# Patient Record
Sex: MALE | Race: WHITE | Employment: FULL TIME | URBAN - METROPOLITAN AREA
[De-identification: names, ages, dates, MRNs, and addresses within clinical notes are randomized per-mention and may not be internally consistent; named-entity substitution may affect disease eponyms.]

---

## 2019-10-21 PROBLEM — G43.719 INTRACTABLE CHRONIC MIGRAINE WITHOUT AURA AND WITHOUT STATUS MIGRAINOSUS: Status: ACTIVE | Noted: 2019-10-21

## 2019-10-21 PROBLEM — K59.09 OTHER CONSTIPATION: Chronic | Status: ACTIVE | Noted: 2019-10-21

## 2019-10-21 PROBLEM — Z87.828 H/O HEAD INJURY: Chronic | Status: ACTIVE | Noted: 2019-10-21

## 2019-11-11 PROBLEM — M25.512 CHRONIC PAIN IN LEFT SHOULDER: Chronic | Status: ACTIVE | Noted: 2019-11-11

## 2019-11-11 PROBLEM — G89.29 CHRONIC PAIN IN LEFT SHOULDER: Chronic | Status: ACTIVE | Noted: 2019-11-11

## 2021-06-10 ENCOUNTER — TELEPHONE (OUTPATIENT)
Dept: FAMILY MEDICINE CLINIC | Age: 51
End: 2021-06-10

## 2021-06-10 NOTE — TELEPHONE ENCOUNTER
Pts wife called stating Nadia Zhou has been in lots of pain & has been up since 4 am. He does have a history or kidney stones. BODØ is wanting to talk to a nurse. She is aware that we are booked for today & tomorrow.

## 2021-06-20 ENCOUNTER — DOCUMENTATION ONLY (OUTPATIENT)
Dept: FAMILY MEDICINE CLINIC | Age: 51
End: 2021-06-20

## 2021-06-20 PROBLEM — Z87.442 HISTORY OF RENAL STONE: Status: ACTIVE | Noted: 2021-06-20

## 2021-06-20 NOTE — PROGRESS NOTES
Pt seen by urologist Dr Christine Michael on 6-15-21 with c/o a kidney stone. He had passed a stone the previous day, grain-sized, no pain. He was educated about diet and fluid intake. The plan is to do a 24 hours urine sample and he will return in late August to have this done.

## 2021-08-04 ENCOUNTER — TELEPHONE (OUTPATIENT)
Dept: FAMILY MEDICINE CLINIC | Age: 51
End: 2021-08-04

## 2021-08-04 DIAGNOSIS — G43.719 INTRACTABLE CHRONIC MIGRAINE WITHOUT AURA AND WITHOUT STATUS MIGRAINOSUS: ICD-10-CM

## 2021-08-04 RX ORDER — RIZATRIPTAN BENZOATE 10 MG/1
10 TABLET ORAL
Qty: 10 TABLET | Refills: 0 | Status: SHIPPED | OUTPATIENT
Start: 2021-08-04 | End: 2022-02-23 | Stop reason: SDUPTHER

## 2021-08-04 NOTE — TELEPHONE ENCOUNTER
Script sent for #10 tabs but future refills will require an appt since it has almost been a year since his last appt and it was virtual so next visit needs to be in office.

## 2021-08-04 NOTE — TELEPHONE ENCOUNTER
Patient requesting maxalt 10 mg sent to [facility]. He states that he had been given this and hadn't needed in a while but migraines have returned.

## 2021-08-04 NOTE — TELEPHONE ENCOUNTER
----- Message from Sonali Manuel sent at 8/4/2021  9:37 AM EDT -----  Regarding: Gabrielle oBjorquez - NP/ refill   #out of this med  Caller (if not patient):Relationship of caller (if not patient):Best contact number(s): [phone] of medication and dosage if known: rizatriptan (MAXALT) 10 mg tabletIs patient out of this medication (yes/no): YesPharm[facility]  [place] [address] listed in chart? (yes/no): NOPharmacy phone number: unknownDate of last visit: 6/22/21Details to clarify the request: pt was given this med for  his migraines that work GREAT. Pt migraines are back and he is OUT of this med. Please call in as soon as possible to [facility].   Thank you

## 2022-02-23 ENCOUNTER — OFFICE VISIT (OUTPATIENT)
Dept: FAMILY MEDICINE CLINIC | Age: 52
End: 2022-02-23

## 2022-02-23 VITALS
SYSTOLIC BLOOD PRESSURE: 159 MMHG | WEIGHT: 186.38 LBS | TEMPERATURE: 98.4 F | BODY MASS INDEX: 26.68 KG/M2 | RESPIRATION RATE: 18 BRPM | OXYGEN SATURATION: 97 % | HEART RATE: 65 BPM | DIASTOLIC BLOOD PRESSURE: 77 MMHG | HEIGHT: 70 IN

## 2022-02-23 DIAGNOSIS — R03.0 ELEVATED BLOOD PRESSURE READING: ICD-10-CM

## 2022-02-23 DIAGNOSIS — G43.119 INTRACTABLE MIGRAINE WITH AURA WITHOUT STATUS MIGRAINOSUS: Primary | ICD-10-CM

## 2022-02-23 DIAGNOSIS — Z59.89 DOES NOT HAVE HEALTH INSURANCE: ICD-10-CM

## 2022-02-23 DIAGNOSIS — J01.00 ACUTE NON-RECURRENT MAXILLARY SINUSITIS: ICD-10-CM

## 2022-02-23 PROCEDURE — 99213 OFFICE O/P EST LOW 20 MIN: CPT | Performed by: NURSE PRACTITIONER

## 2022-02-23 RX ORDER — AZITHROMYCIN 250 MG/1
TABLET, FILM COATED ORAL
Qty: 6 TABLET | Refills: 0 | Status: SHIPPED | OUTPATIENT
Start: 2022-02-23 | End: 2022-02-28

## 2022-02-23 RX ORDER — RIZATRIPTAN BENZOATE 10 MG/1
10 TABLET ORAL
Qty: 10 TABLET | Refills: 5 | Status: SHIPPED | OUTPATIENT
Start: 2022-02-23 | End: 2022-02-23

## 2022-02-23 SDOH — ECONOMIC STABILITY - INCOME SECURITY: OTHER PROBLEMS RELATED TO HOUSING AND ECONOMIC CIRCUMSTANCES: Z59.89

## 2022-02-23 NOTE — PROGRESS NOTES
Subjective:     Chief Complaint   Patient presents with    Headache     gets a \"blurred horizontal line in vision\"    Mass     back of neck       Munir Ellington is a 46 y.o. male accompanied by his wife, who presents today to follow up for migraines. Started having migraines in 2012 after a head injury. His mother gets them, too. He reports a visual aura, he will see a horizontal line running across his vision. Vision will get blurry. Within 20 minutes he will get a migraine. Reports photophobia. Maxalt helps and he would like a refill. He gets a migraine about once a month. Has one now and believes it is related to a sinus infection. He is a  but for the past few weeks he has been doing some work in an old [employer] where he believes there may be mold. He reports nasal congestion and sinus pressure and right ear fullness. Not taking anything OTC for allergies. Denies fever or chills. BP is elevated today but he has been taking lots of Excedrin for his headache since running out of the Maxalt. No hx of HTN. Denies CP, SOB, dizziness, or swelling. He states he has a \"knot\" on the back of his neck. It was swollen last night, better today. He has been doing some overhead work with a pole saw. History of head injury   Back in 2012 he was working on a fish boat and a piece of heavy equipment or machinery fell on top of his head. He was wearing a hard hat but it knocked him unconscious for a few minutes. He says he suffered some brain swelling and was told he would have problems with short term memory loss in the future. He subsequently developed migraine headaches and his neurologist Dr Rachelle Winkler in [place] started him on Nortryptiline. The medication, however, made him feel like a \"zombie\" so he stopped taking it. Has been doing better with Maxalt PRN. Has not seen neuro in over 8 years.        Health maintenance  Colonoscopy- done 10/2019, repeat 5 years (2024)  Needs lipid and Hep C screening. He declines today, he has no insurance. Shingrix-not interested at previous appt, not addressed today  Covid vaccine- not discussed      Patient Active Problem List   Diagnosis Code    Intractable chronic migraine without aura and without status migrainosus G43.719    H/O head injury Z87.828    Other constipation K59.09    Chronic pain in left shoulder M25.512, G89.29    History of renal stone Z87.442       Past Medical History:   Diagnosis Date    Blurred vision     Head injuries 2012    Headache        No current outpatient medications on file.     No Known Allergies    Past Surgical History:   Procedure Laterality Date    HX COLONOSCOPY  2019    normal, repeat in 5 years       Social History     Tobacco Use   Smoking Status Never Smoker   Smokeless Tobacco Never Used       Social History     Socioeconomic History    Marital status: SINGLE   Tobacco Use    Smoking status: Never Smoker    Smokeless tobacco: Never Used   Substance and Sexual Activity    Alcohol use: Not Currently    Drug use: Never    Sexual activity: Yes       Family History   Problem Relation Age of Onset    Cancer Mother     Heart Disease Father        ROS:  Gen: denies fever, chills, or fatigue  HEENT:+H/A, nasal congestion, and right ear fullness, denies sore throat  Resp: denies dyspnea, cough, or wheezing  CV: denies chest pain, pressure, or palpitations  Extremeties: denies edema  GI[de-identified] denies abdominal pain, nausea, vomiting, diarrhea, or constipation  Musculoskeletal: no joint pain, stiffness, or muscle cramps  Neuro: denies numbness/tingling or dizziness  Skin: denies rashes or new lesions +knot on back of neck  Psych: denies anxiety, depression, evelia, or other changes in mood      Objective:     Visit Vitals  BP (!) 159/77 (BP 1 Location: Left arm, BP Patient Position: Sitting)   Pulse 65   Temp 98.4 °F (36.9 °C) (Temporal)   Resp 18   Ht 5' 10\" (1.778 m)   Wt 186 lb 6 oz (84.5 kg)   SpO2 97%   BMI 26.74 kg/m² Body mass index is 26.74 kg/m². General: Alert and oriented. No acute distress. Well nourished. HEENT :  Ears:TMs normal. Canals clear. Eyes: Sclera white, conjunctiva clear. PERRLA. Extra ocular movements intact. Nose: Patent. Nasal mucosa pink, non-edematous, and without drainage. Mouth: Pharynx non-erythematous, without exudate   Neck: Supple with FROM. No thyroid-megaly, carotid bruits, or lymphadenopathy +palpable trigger point to right neck  Lungs: Breathing even and unlabored. All lobes clear to auscultation bilaterally   Heart :RRR, S1 and S2 normal intensity, no extra heart sounds  Extremities: Non-edematous  Neuro: Cranial nerves grossly normal.  Psych: Mood and thought content appropriate for situation. Dressed appropriately and with good hygiene. Skin: Warm, dry, and intact. No lesions or discoloration. Assessment/ Plan:     Migraine headaches   Cont Maxalt prn- take as prescribed. Refilled today  Avoid triggers  F/U prn if migraines worsen in frequency or severity    Acute sinusitis  [facility] he will be finished with his work in [employer] as of tomorrow so will no longer be exposed to the mold  F/U prn if symptoms worsen or do not improve. Elevated BP without hx of HTN  He has been taking lots of Excedrin for his headache since running out of the Maxalt. Advised to start checking BP at home and notify me if SBP >140 or DBP >90. F/U prn elevated BP at home     Health maintenance  Colonoscopy- done 10/2019, repeat 5 years (2024)  Needs lipid and Hep C screening. He declines today, he has no insurance. Shingrix-not interested at previous appt, not addressed today  Covid vaccine- not discussed    Does not have health insurance  Referred to  for assistance selecting health insurance. Wife reports it has been a very frustrating process and they have received a quit for $1300/month which they cannot afford.         Verbal and written instructions (see AVS) provided.  Patient expresses understanding of diagnosis and treatment plan. Health Maintenance Due   Topic Date Due    Hepatitis C Screening  Never done    COVID-19 Vaccine (1) Never done    DTaP/Tdap/Td series (1 - Tdap) Never done    Lipid Screen  Never done    Shingrix Vaccine Age 50> (1 of 2) Never done    Flu Vaccine (1) Never done    Depression Screen  09/16/2021               Sun Arambula, NP

## 2022-02-23 NOTE — PROGRESS NOTES
1. Have you been to the ER, urgent care clinic since your last visit? Hospitalized since your last visit? No    2. Have you seen or consulted any other health care providers outside of the [address] [place] [address] since your last visit? Include any pap smears or colon screening.  No     Chief Complaint   Patient presents with    Headache     gets a \"blurred horizontal line in vision\"    Mass     back of neck     Visit Vitals  BP (!) 159/77 (BP 1 Location: Left arm, BP Patient Position: Sitting)   Pulse 65   Temp 98.4 °F (36.9 °C) (Temporal)   Resp 18   Ht 5' 10\" (1.778 m)   Wt 186 lb 6 oz (84.5 kg)   SpO2 97%   BMI 26.74 kg/m²

## 2022-02-24 ENCOUNTER — DOCUMENTATION ONLY (OUTPATIENT)
Dept: FAMILY MEDICINE CLINIC | Age: 52
End: 2022-02-24

## 2022-03-19 PROBLEM — G43.719 INTRACTABLE CHRONIC MIGRAINE WITHOUT AURA AND WITHOUT STATUS MIGRAINOSUS: Status: ACTIVE | Noted: 2019-10-21

## 2022-03-19 PROBLEM — M25.512 CHRONIC PAIN IN LEFT SHOULDER: Status: ACTIVE | Noted: 2019-11-11

## 2022-03-19 PROBLEM — G89.29 CHRONIC PAIN IN LEFT SHOULDER: Status: ACTIVE | Noted: 2019-11-11

## 2022-03-19 PROBLEM — Z87.442 HISTORY OF RENAL STONE: Status: ACTIVE | Noted: 2021-06-20

## 2022-03-20 PROBLEM — Z87.828 H/O HEAD INJURY: Status: ACTIVE | Noted: 2019-10-21

## 2022-03-20 PROBLEM — K59.09 OTHER CONSTIPATION: Status: ACTIVE | Noted: 2019-10-21

## 2022-09-29 ENCOUNTER — OFFICE VISIT (OUTPATIENT)
Dept: FAMILY MEDICINE CLINIC | Age: 52
End: 2022-09-29

## 2022-09-29 VITALS
SYSTOLIC BLOOD PRESSURE: 150 MMHG | WEIGHT: 186 LBS | BODY MASS INDEX: 26.63 KG/M2 | DIASTOLIC BLOOD PRESSURE: 92 MMHG | OXYGEN SATURATION: 97 % | TEMPERATURE: 98.9 F | RESPIRATION RATE: 16 BRPM | HEART RATE: 60 BPM | HEIGHT: 70 IN

## 2022-09-29 DIAGNOSIS — J40 BRONCHITIS: Primary | ICD-10-CM

## 2022-09-29 DIAGNOSIS — G62.9 NEUROPATHY: ICD-10-CM

## 2022-09-29 DIAGNOSIS — R05.9 COUGH: ICD-10-CM

## 2022-09-29 LAB
EXP DATE SOLUTION: NORMAL
EXP DATE SWAB: NORMAL
FLUAV+FLUBV AG NOSE QL IA.RAPID: NEGATIVE
FLUAV+FLUBV AG NOSE QL IA.RAPID: NEGATIVE
GLUCOSE POC: 102 MG/DL
LOT NUMBER SOLUTION: NORMAL
LOT NUMBER SWAB: NORMAL
SARS-COV-2 RNA POC: NEGATIVE
VALID INTERNAL CONTROL?: NO

## 2022-09-29 PROCEDURE — 87635 SARS-COV-2 COVID-19 AMP PRB: CPT | Performed by: NURSE PRACTITIONER

## 2022-09-29 PROCEDURE — 99213 OFFICE O/P EST LOW 20 MIN: CPT | Performed by: NURSE PRACTITIONER

## 2022-09-29 PROCEDURE — 82962 GLUCOSE BLOOD TEST: CPT | Performed by: NURSE PRACTITIONER

## 2022-09-29 PROCEDURE — 87502 INFLUENZA DNA AMP PROBE: CPT | Performed by: NURSE PRACTITIONER

## 2022-09-29 RX ORDER — DOXYCYCLINE 100 MG/1
100 TABLET ORAL 2 TIMES DAILY
Qty: 20 TABLET | Refills: 0 | Status: SHIPPED | OUTPATIENT
Start: 2022-09-29 | End: 2022-10-09

## 2022-09-29 RX ORDER — BENZONATATE 100 MG/1
100 CAPSULE ORAL
Qty: 21 CAPSULE | Refills: 0 | Status: SHIPPED | OUTPATIENT
Start: 2022-09-29 | End: 2022-10-06

## 2022-09-29 RX ORDER — ALBUTEROL SULFATE 90 UG/1
2 AEROSOL, METERED RESPIRATORY (INHALATION)
Qty: 18 G | Refills: 1 | Status: SHIPPED | OUTPATIENT
Start: 2022-09-29

## 2022-09-29 RX ORDER — GABAPENTIN 300 MG/1
300 CAPSULE ORAL
Qty: 30 CAPSULE | Refills: 0 | Status: SHIPPED | OUTPATIENT
Start: 2022-09-29

## 2022-09-29 RX ORDER — PREDNISONE 20 MG/1
20 TABLET ORAL 2 TIMES DAILY
Qty: 10 TABLET | Refills: 0 | Status: SHIPPED | OUTPATIENT
Start: 2022-09-29

## 2022-09-29 NOTE — PROGRESS NOTES
Identified pt with two pt identifiers(name and ). Reviewed record in preparation for visit and have obtained necessary documentation. Chief Complaint   Patient presents with    Altered mental status     Patient states he was hit in the head on fish boat and saw a neurologist years ago states doctor said he may have complication later. Foot Problem     Left heel numbness, right numbness tip of toes down the outer side        Cold Symptoms     With low grade temp, cough, congestion chest and nasal, headache, shortness of breath       1. \"Have you been to the ER, urgent care clinic since your last visit? Hospitalized since your last visit? \" No    2. \"Have you seen or consulted any other health care providers outside of the [address] [place] [address] since your last visit? \" No     3. For patients aged 39-70: Has the patient had a colonoscopy / FIT/ Cologuard? No      If the patient is female:    4. For patients aged 41-77: Has the patient had a mammogram within the past 2 years? NA - based on age or sex      11. For patients aged 21-65: Has the patient had a pap smear?  NA - based on age or sex      Symptom review:    Yes Fever   NO  Shaking chills  YES Cough  YESHeadaches  YES Body aches  NO  Coughing up blood  NO  Chest congestion  YES  Chest pain  NO  Shortness of breath  YES  Profound Loss of smell/taste  NO  Nausea/Vomiting   NO  Loose stool/Diarrhea  NO  any skin issues

## 2022-10-12 NOTE — PROGRESS NOTES
Destini Silva is a 46 y.o. male who presents today with c/o   Chief Complaint   Patient presents with    Foot Pain     Bilateral       Assessment/ Plan:         ICD-10-CM ICD-9-CM    1. Neuropathy  G62.9 355.9 VITAMIN B12      VITAMIN B12      COLLECTION VENOUS BLOOD,VENIPUNCTURE      2. Elevated blood pressure reading  R03.0 796.2 TSH 3RD GENERATION      CBC WITH AUTOMATED DIFF      METABOLIC PANEL, COMPREHENSIVE      TSH 3RD GENERATION      CBC WITH AUTOMATED DIFF      METABOLIC PANEL, COMPREHENSIVE      3. Other cough  R05.8 786.2 XR CHEST PA LAT      4. Needs flu shot  Z23 V04.81 INFLUENZA, FLUARIX, FLULAVAL, FLUZONE (AGE 6 MO+), AFLURIA(AGE 3Y+) IM, PF, 0.5 ML        Complete chest XR outpatient  Unable to tolerate gabapentin  Check labs today  Will consider EMG study in the future if needed    Orders Placed This Encounter    XR CHEST PA LAT     Standing Status:   Future     Standing Expiration Date:   11/18/2023    Influenza, FLUARIX, FLULAVAL, Fluzone (age 10 mo+), AFLURIA (age 3y+) IM, PF, 0.5 mL    VITAMIN B12     Standing Status:   Future     Number of Occurrences:   1     Standing Expiration Date:   10/19/2023    TSH 3RD GENERATION     Standing Status:   Future     Number of Occurrences:   1     Standing Expiration Date:   10/19/2023    CBC WITH AUTOMATED DIFF     Standing Status:   Future     Number of Occurrences:   1     Standing Expiration Date:   03/13/5676    METABOLIC PANEL, COMPREHENSIVE     Standing Status:   Future     Number of Occurrences:   1     Standing Expiration Date:   10/19/2023    COLLECTION VENOUS BLOOD,VENIPUNCTURE       Follow-up and Dispositions    Return in about 1 month (around 11/19/2022). Subjective:  Destini Silva is a 46 y.o. male here for the following complaints. Works as a . Cough/wheezing  Over the last few weeks he has had trouble sleeping at night because when he lays down at night he cant stop coughing. He feels fine all day at work.  Denies trouble at work, but starts coughing as soon as he gets home. He is coughing up yellow sputum. He also has noted post-nasal drip. He does have zyrtec at home which  have advised him to start taking. He was given doxycycline on his last visit, but he has not completed the course of antibiotics. He now has health insurance and would like to have an Xray completed. Neuropathy  Works as a . He is on his feet all day. For over the last 4 months he noticed a dull sensation on the heels of bilateral feet. He denies back injuries, but does report 'back in 2012 he was working on a fish boat and a piece of heavy equipment or machinery fell on top of his head. He was wearing a hard hat but it knocked him unconscious for a few minutes. He says he suffered some brain swelling and was told he would have problems with short term memory loss in the future. He subsequently developed migraine headaches and his neurologist Dr Aki Meneses in [place] started him on Nortryptiline. The medication, however, made him feel like a \"zombie\" so he stopped taking it. Has been doing better with Maxalt PRN. Has not seen neuro in over 8 years.' He is not sure if he suffered a back injury or not. He denies bowel/bladder incontinence. Pain does not radiate down his legs. The numbness is more annoying than anything else. POC BS in the office on his last visit was 102. He has also changed his shoes, but states this has not helped. Will check a TSH and B12 today. HTN: His blood pressure has been elevated on the last couple of visits. Denies CP, edema, SOB. We will check labs today.        Patient Active Problem List   Diagnosis Code    Intractable chronic migraine without aura and without status migrainosus G43.719    H/O head injury Z87.828    Other constipation K59.09    Chronic pain in left shoulder M25.512, G89.29    History of renal stone Z87.442       Past Medical History:   Diagnosis Date    Blurred vision     Head injuries 2012 Headache          Current Outpatient Medications:     predniSONE (DELTASONE) 20 mg tablet, Take 1 Tablet by mouth two (2) times a day., Disp: 10 Tablet, Rfl: 0    albuterol (PROVENTIL HFA, VENTOLIN HFA, PROAIR HFA) 90 mcg/actuation inhaler, Take 2 Puffs by inhalation every four (4) hours as needed for Wheezing or Shortness of Breath., Disp: 18 g, Rfl: 1    gabapentin (NEURONTIN) 300 mg capsule, Take 1 Capsule by mouth nightly. Max Daily Amount: 300 mg., Disp: 30 Capsule, Rfl: 0    No Known Allergies    Past Surgical History:   Procedure Laterality Date    HX COLONOSCOPY  2019    normal, repeat in 5 years       Social History     Tobacco Use   Smoking Status Never   Smokeless Tobacco Never       Social History     Socioeconomic History    Marital status: SINGLE   Tobacco Use    Smoking status: Never    Smokeless tobacco: Never   Vaping Use    Vaping Use: Never used   Substance and Sexual Activity    Alcohol use: Not Currently    Drug use: Never    Sexual activity: Yes     Social Determinants of Health     Financial Resource Strain: Medium Risk    Difficulty of Paying Living Expenses: Somewhat hard   Food Insecurity: Food Insecurity Present    Worried About Running Out of Food in the Last Year: Sometimes true    Ran Out of Food in the Last Year: Sometimes true       Family History   Problem Relation Age of Onset    Cancer Mother     Heart Disease Father        ROS:  Review of Systems   Constitutional:  Negative for chills and fever. HENT:  Negative for hearing loss. Eyes:  Negative for blurred vision and double vision. Respiratory:  Positive for cough and sputum production (yellow). Negative for wheezing. Cardiovascular:  Negative for chest pain and palpitations. Gastrointestinal:  Negative for abdominal pain, heartburn, nausea and vomiting. Genitourinary:  Negative for dysuria. Musculoskeletal:  Negative for myalgias. Skin:  Negative for itching and rash.    Neurological:  Negative for dizziness and headaches. Numbness in heels of bilateral feet   Psychiatric/Behavioral:  Negative for depression. Objective:     Visit Vitals  /85 (BP 1 Location: Left upper arm)   Pulse 71   Temp 98.1 °F (36.7 °C)   Resp 16   Ht 5' 10\" (1.778 m)   Wt 186 lb (84.4 kg)   SpO2 98%   BMI 26.69 kg/m²     Body mass index is 26.69 kg/m². Physical Exam  Vitals reviewed. Constitutional:       General: He is not in acute distress. Appearance: He is not ill-appearing or toxic-appearing. HENT:      Head: Normocephalic. Right Ear: External ear normal.      Left Ear: External ear normal.      Nose: Nose normal.      Mouth/Throat:      Mouth: Mucous membranes are moist.   Eyes:      Pupils: Pupils are equal, round, and reactive to light. Cardiovascular:      Rate and Rhythm: Normal rate. Pulses: Normal pulses. Heart sounds: No murmur heard. Pulmonary:      Effort: Pulmonary effort is normal. No respiratory distress. Breath sounds: Rhonchi (RLL) present. Chest:      Chest wall: No tenderness. Abdominal:      General: Abdomen is flat. Musculoskeletal:         General: Normal range of motion. Cervical back: Normal range of motion. Skin:     General: Skin is warm and dry. Neurological:      Mental Status: He is alert and oriented to person, place, and time.       Gait: Gait normal.   Psychiatric:         Mood and Affect: Mood normal.         Behavior: Behavior normal.         Results for orders placed or performed in visit on 09/29/22   AMB POC COVID-19 COV   Result Value Ref Range    SARS-COV-2 RNA POC Negative Negative    LOT NUMBER SWAB [id number]     EXP DATE SWAB 3/31/2025     LOT NUMBER SOLUTION [id number]     EXP DATE SOLUTION 05/02/2023    AMB POC GLUCOSE BLOOD, BY GLUCOSE MONITORING DEVICE   Result Value Ref Range    Glucose  MG/DL   AMB POC INFLUENZA A  AND B REAL-TIME RT-PCR   Result Value Ref Range    VALID INTERNAL CONTROL POC No     Influenza A Ag POC Negative Negative    Influenza B Ag POC Negative Negative       No results found for this visit on 10/19/22. Verbal and written instructions (see AVS) provided. Patient expresses understanding of diagnosis and treatment plan. Follow-up and Dispositions    Return in about 1 month (around 11/19/2022). Patient has been advised to contact practice or seek care if condition persists or worsens.      Monika Suarez, NP

## 2022-10-19 ENCOUNTER — HOSPITAL ENCOUNTER (OUTPATIENT)
Dept: GENERAL RADIOLOGY | Age: 52
Discharge: HOME | End: 2022-10-19
Payer: COMMERCIAL

## 2022-10-19 ENCOUNTER — OFFICE VISIT (OUTPATIENT)
Dept: FAMILY MEDICINE CLINIC | Age: 52
End: 2022-10-19
Payer: COMMERCIAL

## 2022-10-19 VITALS
HEIGHT: 70 IN | HEART RATE: 71 BPM | TEMPERATURE: 98.1 F | WEIGHT: 186 LBS | DIASTOLIC BLOOD PRESSURE: 85 MMHG | RESPIRATION RATE: 16 BRPM | BODY MASS INDEX: 26.63 KG/M2 | OXYGEN SATURATION: 98 % | SYSTOLIC BLOOD PRESSURE: 139 MMHG

## 2022-10-19 DIAGNOSIS — R03.0 ELEVATED BLOOD PRESSURE READING: ICD-10-CM

## 2022-10-19 DIAGNOSIS — R05.8 OTHER COUGH: ICD-10-CM

## 2022-10-19 DIAGNOSIS — Z23 NEEDS FLU SHOT: ICD-10-CM

## 2022-10-19 DIAGNOSIS — G62.9 NEUROPATHY: Primary | ICD-10-CM

## 2022-10-19 PROCEDURE — 99214 OFFICE O/P EST MOD 30 MIN: CPT | Performed by: NURSE PRACTITIONER

## 2022-10-19 PROCEDURE — 90686 IIV4 VACC NO PRSV 0.5 ML IM: CPT | Performed by: NURSE PRACTITIONER

## 2022-10-19 PROCEDURE — 90471 IMMUNIZATION ADMIN: CPT | Performed by: NURSE PRACTITIONER

## 2022-10-19 PROCEDURE — 71046 X-RAY EXAM CHEST 2 VIEWS: CPT

## 2022-10-19 PROCEDURE — 36415 COLL VENOUS BLD VENIPUNCTURE: CPT | Performed by: NURSE PRACTITIONER

## 2022-10-19 NOTE — PROGRESS NOTES
Patient was administered Flu shot in left arm deltoid via IM. Patient tolerated Flu shot well. Medication information reviewed with patient, patient states understanding. Patient to resume routine medications at home. Patient given copy of AVS and VIIS with medication information and instructions for home. VIIS reviewed with patient and patient states understanding.

## 2022-10-19 NOTE — PROGRESS NOTES
Identified pt with two pt identifiers(name and ). Reviewed record in preparation for visit and have obtained necessary documentation. Chief Complaint   Patient presents with    Foot Pain     Bilateral         1. \"Have you been to the ER, urgent care clinic since your last visit? Hospitalized since your last visit? \" No    2. \"Have you seen or consulted any other health care providers outside of the [address] [place] [address] since your last visit? \" No     3. For patients aged 39-70: Has the patient had a colonoscopy / FIT/ Cologuard? No      If the patient is female:    4. For patients aged 41-77: Has the patient had a mammogram within the past 2 years? NA - based on age or sex      11. For patients aged 21-65: Has the patient had a pap smear?  NA - based on age or sex

## 2022-10-19 NOTE — PATIENT INSTRUCTIONS
Vaccine Information Statement    Influenza (Flu) Vaccine (Inactivated or Recombinant): What You Need to Know    Many vaccine information statements are available in Green[place]ic and other languages. See www.immunize.org/vis. Hojas de información sobre vacunas están disponibles en español y en muchos otros idiomas. Visite www.immunize.org/vis. 1. Why get vaccinated? Influenza vaccine can prevent influenza (flu). Flu is a contagious disease that spreads around the [facility] every year, usually between October and May. Anyone can get the flu, but it is more dangerous for some people. Infants and young children, people 72 years and older, pregnant people, and people with certain health conditions or a weakened immune system are at greatest risk of flu complications. Pneumonia, bronchitis, sinus infections, and ear infections are examples of flu-related complications. If you have a medical condition, such as heart disease, cancer, or diabetes, flu can make it worse. Flu can cause fever and chills, sore throat, muscle aches, fatigue, cough, headache, and runny or stuffy nose. Some people may have vomiting and diarrhea, though this is more common in children than adults. In an average year, thousands of people in the [facility] die from flu, and many more are hospitalized. Flu vaccine prevents millions of illnesses and flu-related visits to the doctor each year. 2. Influenza vaccines     CDC recommends everyone 6 months and older get vaccinated every flu season. Children 6 months through 6years of age may need 2 doses during a single flu season. Everyone else needs only 1 dose each flu season. It takes about 2 weeks for protection to develop after vaccination. There are many flu viruses, and they are always changing. Each year a new flu vaccine is made to protect against the influenza viruses believed to be likely to cause disease in the upcoming flu season.  Even when the vaccine doesnt exactly match these viruses, it may still provide some protection. Influenza vaccine does not cause flu. Influenza vaccine may be given at the same time as other vaccines. 3. Talk with your health care provider    Tell your vaccination provider if the person getting the vaccine:  Has had an allergic reaction after a previous dose of influenza vaccine, or has any severe, life-threatening allergies   Has ever had Guillain-Barré Syndrome (also called GBS)    In some cases, your health care provider may decide to postpone influenza vaccination until a future visit. Influenza vaccine can be administered at any time during pregnancy. People who are or will be pregnant during influenza season should receive inactivated influenza vaccine. People with minor illnesses, such as a cold, may be vaccinated. People who are moderately or severely ill should usually wait until they recover before getting influenza vaccine. Your health care provider can give you more information. 4. Risks of a vaccine reaction    Soreness, redness, and swelling where the shot is given, fever, muscle aches, and headache can happen after influenza vaccination. There may be a very small increased risk of Guillain-Barré Syndrome (GBS) after inactivated influenza vaccine (the flu shot). Iv[place] Scripture children who get the flu shot along with pneumococcal vaccine (PCV13) and/or DTaP vaccine at the same time might be slightly more likely to have a seizure caused by fever. Tell your health care provider if a child who is getting flu vaccine has ever had a seizure. People sometimes faint after medical procedures, including vaccination. Tell your provider if you feel dizzy or have vision changes or ringing in the ears. As with any medicine, there is a very remote chance of a vaccine causing a severe allergic reaction, other serious injury, or death. 5. What if there is a serious problem?     An allergic reaction could occur after the vaccinated person leaves the clinic. If you see signs of a severe allergic reaction (hives, swelling of the face and throat, difficulty breathing, a fast heartbeat, dizziness, or weakness), call 9-1-1 and get the person to the nearest hospital.    For other signs that concern you, call your health care provider. Adverse reactions should be reported to the Vaccine Adverse Event Reporting System (VAERS). Your health care provider will usually file this report, or you can do it yourself. Visit the VAERS website at www.vaers. [facility].gov or call 6-224.249.6813. VAERS is only for reporting reactions, and VAERS staff members do not give medical advice. 6. The National Vaccine Injury Compensation Program    The [facility] Vaccine Injury Compensation Program (VICP) is a federal program that was created to compensate people who may have been injured by certain vaccines. Claims regarding alleged injury or death due to vaccination have a time limit for filing, which may be as short as two years. Visit the VICP website at www.[facility].gov/vaccinecompensation or call 5-928.721.5599 to learn about the program and about filing a claim. 7. How can I learn more? Ask your health care provider. Call your local or state health department. Visit the website of the Food and Drug Administration (FDA) for vaccine package inserts and additional information at www.fda.gov/vaccines-blood-biologics/vaccines. Contact the Centers for Disease Control and Prevention (CDC): Call 6-851.233.9578 (1-800-CDC-INFO) or  Visit CDCs influenza website at www.cdc.gov/flu. Vaccine Information Statement   Inactivated Influenza Vaccine   8/6/2021  42 FLAVIA Chicas 191XH-22   Department of Health and Human Services  Centers for Disease Control and Prevention    Office Use Only

## 2022-10-20 RX ORDER — DEXTROMETHORPHAN POLISTIREX 30 MG/5ML
60 SUSPENSION ORAL 2 TIMES DAILY
Qty: 200 ML | Refills: 0 | Status: SHIPPED | OUTPATIENT
Start: 2022-10-20 | End: 2022-10-30

## 2022-10-20 RX ORDER — AMOXICILLIN AND CLAVULANATE POTASSIUM 875; 125 MG/1; MG/1
1 TABLET, FILM COATED ORAL 2 TIMES DAILY
Qty: 14 TABLET | Refills: 0 | Status: SHIPPED | OUTPATIENT
Start: 2022-10-20 | End: 2022-10-20 | Stop reason: ALTCHOICE

## 2022-10-20 RX ORDER — LEVOFLOXACIN 750 MG/1
750 TABLET ORAL DAILY
Qty: 7 TABLET | Refills: 0 | Status: SHIPPED | OUTPATIENT
Start: 2022-10-20 | End: 2022-10-27

## 2022-10-20 NOTE — PROGRESS NOTES
Spoke with patients wife over the phone. She was advised levaquin has been ordered since he never completed the doxycyline and he still has a terrible cough keeping up at night. She is requesting we send in codeine cough syrup but I have advised them this is not recommended as treatment anymore. Recommend he takes the antibiotic as prescribed and follows up if symptoms do not start improving.

## 2022-10-20 NOTE — PROGRESS NOTES
He still has a possible right lower lung pneumonia. I added another antibiotic called Augmentin for him to take. I would also like him to finish the doxycyline as well.

## 2022-10-21 ENCOUNTER — APPOINTMENT (OUTPATIENT)
Dept: GENERAL RADIOLOGY | Age: 52
End: 2022-10-21
Attending: EMERGENCY MEDICINE
Payer: COMMERCIAL

## 2022-10-21 ENCOUNTER — HOSPITAL ENCOUNTER (EMERGENCY)
Age: 52
Discharge: HOME | End: 2022-10-21
Attending: EMERGENCY MEDICINE
Payer: COMMERCIAL

## 2022-10-21 ENCOUNTER — NURSE TRIAGE (OUTPATIENT)
Dept: OTHER | Facility: CLINIC | Age: 52
End: 2022-10-21

## 2022-10-21 VITALS
TEMPERATURE: 98 F | SYSTOLIC BLOOD PRESSURE: 129 MMHG | HEART RATE: 77 BPM | DIASTOLIC BLOOD PRESSURE: 61 MMHG | RESPIRATION RATE: 20 BRPM | OXYGEN SATURATION: 96 %

## 2022-10-21 DIAGNOSIS — J18.9 COMMUNITY ACQUIRED PNEUMONIA OF RIGHT LOWER LOBE OF LUNG: Primary | ICD-10-CM

## 2022-10-21 LAB
ALBUMIN SERPL-MCNC: 4.2 G/DL (ref 3.8–4.9)
ALBUMIN/GLOB SERPL: 1.6 {RATIO} (ref 1.2–2.2)
ALP SERPL-CCNC: 84 IU/L (ref 44–121)
ALT SERPL-CCNC: 22 IU/L (ref 0–44)
ANION GAP SERPL CALC-SCNC: 8 MMOL/L (ref 5–15)
AST SERPL-CCNC: 29 IU/L (ref 0–40)
BASOPHILS # BLD AUTO: 0.1 X10E3/UL (ref 0–0.2)
BASOPHILS # BLD: 0 K/UL (ref 0–0.1)
BASOPHILS NFR BLD AUTO: 1 %
BASOPHILS NFR BLD: 1 % (ref 0–1)
BILIRUB SERPL-MCNC: 0.3 MG/DL (ref 0–1.2)
BUN SERPL-MCNC: 13 MG/DL (ref 6–20)
BUN SERPL-MCNC: 17 MG/DL (ref 6–24)
BUN/CREAT SERPL: 12 (ref 12–20)
BUN/CREAT SERPL: 19 (ref 9–20)
CALCIUM SERPL-MCNC: 8.8 MG/DL (ref 8.5–10.1)
CALCIUM SERPL-MCNC: 9.3 MG/DL (ref 8.7–10.2)
CHLORIDE SERPL-SCNC: 105 MMOL/L (ref 96–106)
CHLORIDE SERPL-SCNC: 106 MMOL/L (ref 97–108)
CO2 SERPL-SCNC: 24 MMOL/L (ref 20–29)
CO2 SERPL-SCNC: 26 MMOL/L (ref 21–32)
CREAT SERPL-MCNC: 0.91 MG/DL (ref 0.76–1.27)
CREAT SERPL-MCNC: 1.07 MG/DL (ref 0.7–1.3)
DIFFERENTIAL METHOD BLD: ABNORMAL
EGFR: 101 ML/MIN/1.73
EOSINOPHIL # BLD AUTO: 0.7 X10E3/UL (ref 0–0.4)
EOSINOPHIL # BLD: 0.3 K/UL (ref 0–0.4)
EOSINOPHIL NFR BLD AUTO: 11 %
EOSINOPHIL NFR BLD: 6 % (ref 0–7)
ERYTHROCYTE [DISTWIDTH] IN BLOOD BY AUTOMATED COUNT: 13 % (ref 11.5–14.5)
ERYTHROCYTE [DISTWIDTH] IN BLOOD BY AUTOMATED COUNT: 13 % (ref 11.6–15.4)
GLOBULIN SER CALC-MCNC: 2.7 G/DL (ref 1.5–4.5)
GLUCOSE SERPL-MCNC: 123 MG/DL (ref 65–100)
GLUCOSE SERPL-MCNC: 91 MG/DL (ref 70–99)
HCT VFR BLD AUTO: 39.7 % (ref 36.6–50.3)
HCT VFR BLD AUTO: 42.2 % (ref 37.5–51)
HGB BLD-MCNC: 13.6 G/DL (ref 12.1–17)
HGB BLD-MCNC: 13.6 G/DL (ref 13–17.7)
IMM GRANULOCYTES # BLD AUTO: 0 K/UL (ref 0–0.04)
IMM GRANULOCYTES # BLD AUTO: 0 X10E3/UL (ref 0–0.1)
IMM GRANULOCYTES NFR BLD AUTO: 0 %
IMM GRANULOCYTES NFR BLD AUTO: 0 % (ref 0–0.5)
LYMPHOCYTES # BLD AUTO: 1.5 X10E3/UL (ref 0.7–3.1)
LYMPHOCYTES # BLD: 1.1 K/UL (ref 0.8–3.5)
LYMPHOCYTES NFR BLD AUTO: 23 %
LYMPHOCYTES NFR BLD: 20 % (ref 12–49)
MCH RBC QN AUTO: 28.8 PG (ref 26.6–33)
MCH RBC QN AUTO: 29.6 PG (ref 26–34)
MCHC RBC AUTO-ENTMCNC: 32.2 G/DL (ref 31.5–35.7)
MCHC RBC AUTO-ENTMCNC: 34.3 G/DL (ref 30–36.5)
MCV RBC AUTO: 86.3 FL (ref 80–99)
MCV RBC AUTO: 89 FL (ref 79–97)
MONOCYTES # BLD AUTO: 0.6 X10E3/UL (ref 0.1–0.9)
MONOCYTES # BLD: 0.5 K/UL (ref 0–1)
MONOCYTES NFR BLD AUTO: 9 %
MONOCYTES NFR BLD: 9 % (ref 5–13)
NEUTROPHILS # BLD AUTO: 3.6 X10E3/UL (ref 1.4–7)
NEUTROPHILS NFR BLD AUTO: 56 %
NEUTS SEG # BLD: 3.7 K/UL (ref 1.8–8)
NEUTS SEG NFR BLD: 65 % (ref 32–75)
NRBC # BLD: 0 K/UL (ref 0–0.01)
NRBC BLD-RTO: 0 PER 100 WBC
PLATELET # BLD AUTO: 218 K/UL (ref 150–400)
PLATELET # BLD AUTO: 263 X10E3/UL (ref 150–450)
PMV BLD AUTO: 8.4 FL (ref 8.9–12.9)
POTASSIUM SERPL-SCNC: 3.6 MMOL/L (ref 3.5–5.1)
POTASSIUM SERPL-SCNC: 4.4 MMOL/L (ref 3.5–5.2)
PROT SERPL-MCNC: 6.9 G/DL (ref 6–8.5)
RBC # BLD AUTO: 4.6 M/UL (ref 4.1–5.7)
RBC # BLD AUTO: 4.73 X10E6/UL (ref 4.14–5.8)
SODIUM SERPL-SCNC: 140 MMOL/L (ref 136–145)
SODIUM SERPL-SCNC: 143 MMOL/L (ref 134–144)
TROPONIN-HIGH SENSITIVITY: 8 NG/L (ref 0–76)
TSH SERPL DL<=0.005 MIU/L-ACNC: 2.72 UIU/ML (ref 0.45–4.5)
VIT B12 SERPL-MCNC: >2000 PG/ML (ref 232–1245)
WBC # BLD AUTO: 5.7 K/UL (ref 4.1–11.1)
WBC # BLD AUTO: 6.5 X10E3/UL (ref 3.4–10.8)

## 2022-10-21 PROCEDURE — 36415 COLL VENOUS BLD VENIPUNCTURE: CPT

## 2022-10-21 PROCEDURE — 85025 COMPLETE CBC W/AUTO DIFF WBC: CPT

## 2022-10-21 PROCEDURE — 84484 ASSAY OF TROPONIN QUANT: CPT

## 2022-10-21 PROCEDURE — 80048 BASIC METABOLIC PNL TOTAL CA: CPT

## 2022-10-21 PROCEDURE — 74011250636 HC RX REV CODE- 250/636: Performed by: EMERGENCY MEDICINE

## 2022-10-21 PROCEDURE — 93005 ELECTROCARDIOGRAM TRACING: CPT

## 2022-10-21 PROCEDURE — 71045 X-RAY EXAM CHEST 1 VIEW: CPT

## 2022-10-21 PROCEDURE — 99285 EMERGENCY DEPT VISIT HI MDM: CPT

## 2022-10-21 RX ORDER — NEBULIZER AND COMPRESSOR
1 EACH MISCELLANEOUS
Qty: 1 EACH | Refills: 0 | Status: SHIPPED | OUTPATIENT
Start: 2022-10-21 | End: 2022-10-21 | Stop reason: SDUPTHER

## 2022-10-21 RX ORDER — ALBUTEROL SULFATE 0.83 MG/ML
2.5 SOLUTION RESPIRATORY (INHALATION)
Qty: 30 EACH | Refills: 0 | Status: SHIPPED | OUTPATIENT
Start: 2022-10-21 | End: 2022-11-08 | Stop reason: SDUPTHER

## 2022-10-21 RX ORDER — CODEINE PHOSPHATE AND GUAIFENESIN 10; 100 MG/5ML; MG/5ML
5 SOLUTION ORAL
Qty: 120 ML | Refills: 0 | Status: SHIPPED | OUTPATIENT
Start: 2022-10-21 | End: 2022-10-24

## 2022-10-21 RX ORDER — ALBUTEROL SULFATE 0.83 MG/ML
2.5 SOLUTION RESPIRATORY (INHALATION)
Qty: 30 EACH | Refills: 0 | Status: SHIPPED | OUTPATIENT
Start: 2022-10-21 | End: 2022-10-21 | Stop reason: SDUPTHER

## 2022-10-21 RX ORDER — NEBULIZER AND COMPRESSOR
1 EACH MISCELLANEOUS
Qty: 1 EACH | Refills: 0 | Status: SHIPPED | OUTPATIENT
Start: 2022-10-21

## 2022-10-21 RX ADMIN — SODIUM CHLORIDE 1000 ML: 9 INJECTION, SOLUTION INTRAVENOUS at 08:41

## 2022-10-21 NOTE — TELEPHONE ENCOUNTER
Received call from Genaro Porras at [facility] with Red Flag Complaint. Subjective: Caller states \"cough\"     Current Symptoms:   Was seen 10/19  Rattling heard in his chest  Sent for chest xray, was told he has PNA  Given delsym and levaquin  Has not slept in 3 days d/t coughing  Unable to lay flat  Delusional  Not drinking water because he is sick to his stomach  Barely able to  the shower  SOB, seems to be really struggling    Recommended disposition: Call  Now    Care advice provided, patient verbalizes understanding; denies any other questions or concerns; instructed to call back for any new or worsening symptoms. Patient/caller agrees to calling 911    Attention Provider: Thank you for allowing me to participate in the care of your patient. The patient was connected to triage in response to information provided to the ECC. Please do not respond through this encounter as the response is not directed to a shared pool.     Reason for Disposition   SEVERE difficulty breathing (e.g., struggling for each breath, speaks in single words)    Protocols used: Cough-ADULT-[place]

## 2022-10-21 NOTE — Clinical Note
[facility] EMERGENCY DEP  [address] [place] [address]  [phone]    Work/School Note    Date: 10/21/2022    To Whom It May concern:    Annette Cuellar was seen and treated today in the emergency room by the following provider(s):  Attending Provider: Deborrah Favre, MD.      Annette Cuellar is excused from work/school on 10/21/22 and 10/22/22. He is medically clear to return to work/school on 10/23/2022.        Sincerely,          Nash Jeff MD

## 2022-10-21 NOTE — ED PROVIDER NOTES
EMERGENCY DEPARTMENT HISTORY AND PHYSICAL EXAM      Date: 10/21/2022  Patient Name: Annette Cuellar    History of Presenting Illness     Chief Complaint   Patient presents with    Shortness of Breath     Increasing SOB with recent dx of bronchitis , started atbx last night and felt SOB when trying to sleep. History Provided By: Patient    HPI: Annette Cuellar, 46 y.o. male with PMHx significant for , presents via EMS to the ED with cc of shortness of breath. Patient reports he has had a cough for the past month. He originally saw his primary doctor, Marta Levy on September 2019 was diagnosed with bronchitis. On chart review, he was started o COVID and flu were negative on that visit. N prednisone albuterol Tessalon Perles and doxycycline. He had follow-up 2 days ago on the 19th. Office persisted. Occasionally would get white and yellow sputum. He also has nasal congestion. He had been taking home Zyrtec. He did not complete the course of doxycycline. Ports he had an outpatient x-ray on Wednesday that showed he had pneumonia. He was started on it new antibiotic. He has had 1 dose so far. He does not know what it was. He states he woke up this morning and felt lightheaded and slightly more short of breath than usual.  He called the nurse triage line for his insurance and they recommended he come to the emergency room. He works for [employer] PMHx: Significant for migraine headaches  PSHx: Significant for colonoscopy. Social Hx: Non-smoker. There are no other complaints, changes, or physical findings at this time. PCP: Glen Mitchell NP    No current facility-administered medications on file prior to encounter. Current Outpatient Medications on File Prior to Encounter   Medication Sig Dispense Refill    levoFLOXacin (LEVAQUIN) 750 mg tablet Take 1 Tablet by mouth daily for 7 days.  7 Tablet 0    dextromethorphan (Delsym) 30 mg/5 mL liquid Take 10 mL by mouth two (2) times a day for 10 days. 200 mL 0    predniSONE (DELTASONE) 20 mg tablet Take 1 Tablet by mouth two (2) times a day. 10 Tablet 0    albuterol (PROVENTIL HFA, VENTOLIN HFA, PROAIR HFA) 90 mcg/actuation inhaler Take 2 Puffs by inhalation every four (4) hours as needed for Wheezing or Shortness of Breath. 18 g 1    gabapentin (NEURONTIN) 300 mg capsule Take 1 Capsule by mouth nightly. Max Daily Amount: 300 mg. 30 Capsule 0       Past History     Past Medical History:  Past Medical History:   Diagnosis Date    Blurred vision     Head injuries 2012    Headache        Past Surgical History:  Past Surgical History:   Procedure Laterality Date    HX COLONOSCOPY  2019    normal, repeat in 5 years       Family History:  Family History   Problem Relation Age of Onset    Cancer Mother     Heart Disease Father        Social History:  Social History     Tobacco Use    Smoking status: Never    Smokeless tobacco: Never   Vaping Use    Vaping Use: Never used   Substance Use Topics    Alcohol use: Not Currently    Drug use: Never       Allergies:  No Known Allergies      Review of Systems   Review of Systems   Constitutional:  Negative for activity change, chills and fever. HENT:  Negative for congestion and sore throat. Eyes:  Negative for pain and redness. Respiratory:  Positive for cough, shortness of breath and wheezing. Negative for chest tightness. Cardiovascular:  Negative for chest pain and palpitations. Gastrointestinal:  Negative for abdominal pain, diarrhea, nausea and vomiting. Genitourinary:  Negative for dysuria, frequency and urgency. Musculoskeletal:  Negative for back pain and neck pain. Skin:  Negative for rash. Neurological:  Positive for light-headedness. Negative for syncope and headaches. Psychiatric/Behavioral:  Negative for confusion. All other systems reviewed and are negative. Physical Exam   Physical Exam  Vitals and nursing note reviewed.    Constitutional: General: He is not in acute distress. Appearance: He is well-developed. He is not diaphoretic. HENT:      Head: Normocephalic. Nose: Nose normal.      Mouth/Throat:      Pharynx: No oropharyngeal exudate. Eyes:      General: No scleral icterus. Conjunctiva/sclera: Conjunctivae normal.      Pupils: Pupils are equal, round, and reactive to light. Neck:      Thyroid: No thyromegaly. Vascular: No JVD. Trachea: No tracheal deviation. Cardiovascular:      Rate and Rhythm: Normal rate and regular rhythm. Heart sounds: No murmur heard. No friction rub. No gallop. Pulmonary:      Effort: Pulmonary effort is normal. No respiratory distress. Breath sounds: Normal breath sounds. No stridor. No wheezing or rales. Comments: Mild bilateral expiratory wheezing. Good air movement. Talks in full sentences. No increased work of breathing. Abdominal:      General: Bowel sounds are normal. There is no distension. Palpations: Abdomen is soft. Tenderness: There is no abdominal tenderness. There is no guarding or rebound. Musculoskeletal:         General: Normal range of motion. Cervical back: Normal range of motion and neck supple. Lymphadenopathy:      Cervical: No cervical adenopathy. Skin:     General: Skin is warm and dry. Findings: No erythema or rash. Neurological:      Mental Status: He is alert and oriented to person, place, and time. Cranial Nerves: No cranial nerve deficit. Motor: No abnormal muscle tone.       Coordination: Coordination normal.   Psychiatric:         Behavior: Behavior normal.           Diagnostic Study Results     Labs -     Recent Results (from the past 12 hour(s))   CBC WITH AUTOMATED DIFF    Collection Time: 10/21/22  8:38 AM   Result Value Ref Range    WBC 5.7 4.1 - 11.1 K/uL    RBC 4.60 4.10 - 5.70 M/uL    HGB 13.6 12.1 - 17.0 g/dL    HCT 39.7 36.6 - 50.3 %    MCV 86.3 80.0 - 99.0 FL    MCH 29.6 26.0 - 34.0 PG    MCHC 34.3 30.0 - 36.5 g/dL    RDW 13.0 11.5 - 14.5 %    PLATELET 397 690 - 805 K/uL    MPV 8.4 (L) 8.9 - 12.9 FL    NRBC 0.0 0.0  WBC    ABSOLUTE NRBC 0.00 0.00 - 0.01 K/uL    NEUTROPHILS 65 32 - 75 %    LYMPHOCYTES 20 12 - 49 %    MONOCYTES 9 5 - 13 %    EOSINOPHILS 6 0 - 7 %    BASOPHILS 1 0 - 1 %    IMMATURE GRANULOCYTES 0 0 - 0.5 %    ABS. NEUTROPHILS 3.7 1.8 - 8.0 K/UL    ABS. LYMPHOCYTES 1.1 0.8 - 3.5 K/UL    ABS. MONOCYTES 0.5 0.0 - 1.0 K/UL    ABS. EOSINOPHILS 0.3 0.0 - 0.4 K/UL    ABS. BASOPHILS 0.0 0.0 - 0.1 K/UL    ABS. IMM. GRANS. 0.0 0.00 - 0.04 K/UL    DF AUTOMATED     METABOLIC PANEL, BASIC    Collection Time: 10/21/22  8:38 AM   Result Value Ref Range    Sodium 140 136 - 145 mmol/L    Potassium 3.6 3.5 - 5.1 mmol/L    Chloride 106 97 - 108 mmol/L    CO2 26 21 - 32 mmol/L    Anion gap 8 5 - 15 mmol/L    Glucose 123 (H) 65 - 100 mg/dL    BUN 13 6 - 20 MG/DL    Creatinine 1.07 0.70 - 1.30 MG/DL    BUN/Creatinine ratio 12 12 - 20      eGFR >60 >60 ml/min/1.73m2    Calcium 8.8 8.5 - 10.1 MG/DL   TROPONIN-HIGH SENSITIVITY    Collection Time: 10/21/22  8:38 AM   Result Value Ref Range    Troponin-High Sensitivity 8 0 - 76 ng/L       Radiologic Studies -   XR CHEST SNGL V   Final Result   Mildly increased airspace disease in the right lower lobe. CT Results  (Last 48 hours)      None          CXR Results  (Last 48 hours)                 10/21/22 0846  XR CHEST SNGL V Final result    Impression:  Mildly increased airspace disease in the right lower lobe. Narrative:  EXAM: XR CHEST SNGL V       HISTORY: Chest Pain. COMPARISON: 10/19/2022       FINDINGS: Single view(s) of the chest. There is increased moderate sized area of   patchy opacification in the lateral right lower lobe. No pleural effusion, or   pneumothorax. The cardiomediastinal silhouette is unremarkable. The visualized   osseous structures are unremarkable.            10/19/22 1928  XR CHEST PA LAT Final result Impression:  RLL airspace disease. Narrative:  INDICATION: Cough       EXAM: CXR 2 View       FINDINGS: Frontal and lateral views of the chest mild patchy airspace disease in   the right lower lung, possible pneumonia. Heart size is normal. There is no   pulmonary edema. There is no evident pneumothorax, adenopathy or effusion. Medical Decision Making   I am the first provider for this patient. I reviewed the vital signs, available nursing notes, past medical history, past surgical history, family history and social history. Vital Signs-Reviewed the patient's vital signs. Patient Vitals for the past 12 hrs:   Temp Pulse Resp BP SpO2   10/21/22 0915 -- 77 20 129/61 96 %   10/21/22 0900 -- 70 17 (!) 151/79 97 %   10/21/22 0845 -- 71 17 (!) 150/81 99 %   10/21/22 0836 98 °F (36.7 °C) -- -- -- --   10/21/22 0834 -- -- -- -- 100 %   10/21/22 0830 -- 72 17 (!) 144/86 96 %       Pulse Oximetry Analysis - 100% on RA    Cardiac Monitor:   Rate: 72 bpm  Rhythm: Normal Sinus Rhythm        ED EKG interpretation:8:29  Rhythm: normal sinus rhythm; and regular . Rate (approx.): 69; Axis: normal; UT Interval: normal; QRS interval: normal ; ST/T wave: non-specific changes; This EKG was interpreted by Sruthi Martinez MD,ED Provider. Records Reviewed: Nursing Notes, Old Medical Records, and Ambulance Run Sheet    Provider Notes (Medical Decision Making):   DDx: Pneumonia, anxiety, asthma, ACS. ED Course:   Initial assessment performed. The patients presenting problems have been discussed, and they are in agreement with the care plan formulated and outlined with them. I have encouraged them to ask questions as they arise throughout their visit. PROGRESS NOTE    Pt reevaluated. CBC and BMP unremarkable. No increased WBC. Negative troponin. Patient afebrile and nontoxic. 100% on room air. Patient currently on Levaquin. Advised him to continue this antibiotic.   Will discharge with nebulizer as he noticed improvement with the nebulizer but has not been noticing improvement with albuterol inhaler. Written by Kristine Capps MD     Progress note:    Pt noted to be feeling better , ready for discharge. Updated pt and/or family on all final lab and imaging findings. Will follow up as instructed. All questions have been answered, pt voiced understanding and agreement with plan. Specific return precautions provided as well as instructions to return to the ED should sx worsen at any time. Vital signs stable for discharge. I have also put together some discharge instructions for them that include: 1) educational information regarding their diagnosis, 2) how to care for their diagnosis at home, as well a 3) list of reasons why they would want to return to the ED prior to their follow-up appointment, should their condition change. Written by Kristine Capps MD        Critical Care Time:   0    Disposition:  Discharge    PLAN:  1. Current Discharge Medication List        START taking these medications    Details   albuterol (PROVENTIL VENTOLIN) 2.5 mg /3 mL (0.083 %) nebu 3 mL by Nebulization route every four (4) hours as needed for Wheezing. Qty: 30 Each, Refills: 0  Start date: 10/21/2022    Comments: 1 box please      Nebulizer & Compressor machine 1 Each by Does Not Apply route every four (4) hours as needed for Wheezing or Shortness of Breath. As directed  Qty: 1 Each, Refills: 0  Start date: 10/21/2022           CONTINUE these medications which have NOT CHANGED    Details   albuterol (PROVENTIL HFA, VENTOLIN HFA, PROAIR HFA) 90 mcg/actuation inhaler Take 2 Puffs by inhalation every four (4) hours as needed for Wheezing or Shortness of Breath. Qty: 18 g, Refills: 1    Associated Diagnoses: Bronchitis           2.    Follow-up Information       Follow up With Specialties Details Why Contact Pawan Gloria NP Nurse Practitioner Schedule an appointment as soon as possible for a visit in 3 days  [address] [facility] [address]      [address] [facility] Go in 1 day If symptoms worsen [address] [address] [phone]          Return to ED if worse     Diagnosis     Clinical Impression:   1. Community acquired pneumonia of right lower lobe of lung              Please note that this dictation was completed with [employer], the computer voice recognition software. Quite often unanticipated grammatical, syntax, homophones, and other interpretive errors are inadvertently transcribed by the computer software. Please disregard these errors. Please excuse any errors that have escaped final proofreading.

## 2022-10-22 ENCOUNTER — PATIENT MESSAGE (OUTPATIENT)
Dept: FAMILY MEDICINE CLINIC | Age: 52
End: 2022-10-22

## 2022-10-22 LAB
ATRIAL RATE: 69 BPM
CALCULATED P AXIS, ECG09: 32 DEGREES
CALCULATED R AXIS, ECG10: 8 DEGREES
CALCULATED T AXIS, ECG11: 12 DEGREES
DIAGNOSIS, 93000: NORMAL
P-R INTERVAL, ECG05: 126 MS
Q-T INTERVAL, ECG07: 412 MS
QRS DURATION, ECG06: 84 MS
QTC CALCULATION (BEZET), ECG08: 441 MS
VENTRICULAR RATE, ECG03: 69 BPM

## 2022-10-22 NOTE — PROGRESS NOTES
Thyroid is normal.   No infection noted in the labs. Your B12 level is really high but there is no sign of liver disease or diabetes. Do you take B12?   [employer] message sent

## 2022-10-24 ENCOUNTER — OFFICE VISIT (OUTPATIENT)
Dept: FAMILY MEDICINE CLINIC | Age: 52
End: 2022-10-24
Payer: COMMERCIAL

## 2022-10-24 VITALS
SYSTOLIC BLOOD PRESSURE: 139 MMHG | BODY MASS INDEX: 25.6 KG/M2 | OXYGEN SATURATION: 98 % | RESPIRATION RATE: 16 BRPM | HEIGHT: 70 IN | WEIGHT: 178.8 LBS | DIASTOLIC BLOOD PRESSURE: 60 MMHG | TEMPERATURE: 98.2 F | HEART RATE: 68 BPM

## 2022-10-24 DIAGNOSIS — J18.9 COMMUNITY ACQUIRED PNEUMONIA OF RIGHT LOWER LOBE OF LUNG: Primary | ICD-10-CM

## 2022-10-24 DIAGNOSIS — R74.8 ELEVATED VITAMIN B12 LEVEL: ICD-10-CM

## 2022-10-24 PROCEDURE — 99213 OFFICE O/P EST LOW 20 MIN: CPT | Performed by: NURSE PRACTITIONER

## 2022-10-24 RX ORDER — TRAZODONE HYDROCHLORIDE 50 MG/1
50 TABLET ORAL
Qty: 30 TABLET | Refills: 0 | Status: SHIPPED | OUTPATIENT
Start: 2022-10-24

## 2022-10-24 NOTE — TELEPHONE ENCOUNTER
From: Petty Do NP  To: Aurora Garay  Sent: 10/22/2022 8:01 AM EDT  Subject: labs    Hi Diony,  Thyroid is normal.   No infection noted in the labs. Your B12 level is really high but there is no sign of liver disease or diabetes. Do you take B12 supplements? We can discuss this in more detail on your next appointment.      Petty Do NP

## 2022-10-24 NOTE — LETTER
10/24/22    Please excuse Adri Flynn from work 10/24/22-10/27/22. He was seen in our office. He may return to work on 10/28/22.        Madison Hardy, NP

## 2022-10-24 NOTE — PROGRESS NOTES
Tremaine Kang is a 46 y.o. male who presents today with c/o   Chief Complaint   Patient presents with    Shortness of Breath     ER Follow-up     Assessment/ Plan:         ICD-10-CM ICD-9-CM    1. Community acquired pneumonia of right lower lobe of lung  J18.9 486       2. Elevated vitamin B12 level  R74.8 790.99 VITAMIN B12      VITAMIN B12        Continue the antibiotic  Work note provided  Check B12 on his next appt    Orders Placed This Encounter    VITAMIN B12     Standing Status:   Future     Number of Occurrences:   1     Standing Expiration Date:   10/24/2023           Subjective:  Tremaine Kang is a 46 y.o. male here for ER follow-up. He had a chest XR completed last week which showed right lower lobe pneumonia. He has been taking levaquin which I advised him to continue for his PNA in the right lower lobe. He is also taking codeine cough syrup which was prescribed by the ER. ER also prescribed him a nebulizer which has helped. Today, he feels better. He is ambulating without difficulty. 02 sat is 98% in the office today. His main complaint today is difficulty sleeping. He has been sitting upright at home, but he just cant sleep. He would like something to help him sleep. I will rx a short course of trazodone for him today. Neuropathy  Of note, his vitamin B12 level came back really high and I will repeat this on his next appointment to confirm because I am not sure of the cause of his elevated B12 level. He does not taking B12 supplements.    Lab Results   Component Value Date/Time    Vitamin B12 >2000 (H) 10/19/2022 07:20 AM         Patient Active Problem List   Diagnosis Code    Intractable chronic migraine without aura and without status migrainosus G43.719    H/O head injury Z87.828    Other constipation K59.09    Chronic pain in left shoulder M25.512, G89.29    History of renal stone Z87.442       Past Medical History:   Diagnosis Date    Blurred vision     Head injuries 2012    Headache Current Outpatient Medications:     albuterol (PROVENTIL VENTOLIN) 2.5 mg /3 mL (0.083 %) nebu, 3 mL by Nebulization route every four (4) hours as needed for Wheezing., Disp: 30 Each, Rfl: 0    Nebulizer & Compressor machine, 1 Each by Does Not Apply route every four (4) hours as needed for Wheezing or Shortness of Breath. As directed, Disp: 1 Each, Rfl: 0    guaiFENesin-codeine (ROBITUSSIN AC) 100-10 mg/5 mL solution, Take 5 mL by mouth three (3) times daily as needed for Cough for up to 3 days. Max Daily Amount: 15 mL., Disp: 120 mL, Rfl: 0    levoFLOXacin (LEVAQUIN) 750 mg tablet, Take 1 Tablet by mouth daily for 7 days. , Disp: 7 Tablet, Rfl: 0    dextromethorphan (Delsym) 30 mg/5 mL liquid, Take 10 mL by mouth two (2) times a day for 10 days. , Disp: 200 mL, Rfl: 0    predniSONE (DELTASONE) 20 mg tablet, Take 1 Tablet by mouth two (2) times a day., Disp: 10 Tablet, Rfl: 0    albuterol (PROVENTIL HFA, VENTOLIN HFA, PROAIR HFA) 90 mcg/actuation inhaler, Take 2 Puffs by inhalation every four (4) hours as needed for Wheezing or Shortness of Breath., Disp: 18 g, Rfl: 1    gabapentin (NEURONTIN) 300 mg capsule, Take 1 Capsule by mouth nightly.  Max Daily Amount: 300 mg., Disp: 30 Capsule, Rfl: 0    No Known Allergies    Past Surgical History:   Procedure Laterality Date    HX COLONOSCOPY  2019    normal, repeat in 5 years       Social History     Tobacco Use   Smoking Status Never   Smokeless Tobacco Never       Social History     Socioeconomic History    Marital status: SINGLE   Tobacco Use    Smoking status: Never    Smokeless tobacco: Never   Vaping Use    Vaping Use: Never used   Substance and Sexual Activity    Alcohol use: Not Currently    Drug use: Never    Sexual activity: Yes     Social Determinants of Health     Financial Resource Strain: Medium Risk    Difficulty of Paying Living Expenses: Somewhat hard   Food Insecurity: Food Insecurity Present    Worried About Running Out of Food in the Last Year: Sometimes true    Ran Out of Food in the Last Year: Sometimes true       Family History   Problem Relation Age of Onset    Cancer Mother     Heart Disease Father        ROS:  Review of Systems   Constitutional:  Negative for chills and fever. HENT:  Negative for hearing loss. Eyes:  Negative for blurred vision and double vision. Respiratory:  Positive for cough and sputum production. Cardiovascular:  Negative for chest pain and palpitations. Gastrointestinal:  Negative for heartburn. Genitourinary:  Negative for dysuria. Musculoskeletal:  Negative for myalgias. Skin:  Negative for rash. Neurological:  Negative for dizziness. Psychiatric/Behavioral:  Negative for depression. Objective:     Visit Vitals  /60 (BP 1 Location: Left upper arm)   Pulse 68   Temp 98.2 °F (36.8 °C) (Oral)   Resp 16   Ht 5' 10\" (1.778 m)   Wt 178 lb 12.8 oz (81.1 kg)   SpO2 98%   BMI 25.66 kg/m²     Body mass index is 25.66 kg/m². Physical Exam  Vitals reviewed. Constitutional:       General: He is not in acute distress. Appearance: He is not ill-appearing or toxic-appearing. HENT:      Head: Normocephalic. Right Ear: External ear normal.      Left Ear: External ear normal.      Nose: Nose normal.      Mouth/Throat:      Mouth: Mucous membranes are moist.   Eyes:      Pupils: Pupils are equal, round, and reactive to light. Cardiovascular:      Rate and Rhythm: Normal rate. Pulses: Normal pulses. Pulmonary:      Effort: Pulmonary effort is normal. No respiratory distress. Breath sounds: No wheezing or rales. Comments: Diminished right lower lobe  Chest:      Chest wall: No tenderness. Abdominal:      General: Abdomen is flat. Musculoskeletal:         General: Normal range of motion. Cervical back: Normal range of motion. Skin:     General: Skin is warm. Neurological:      Mental Status: He is alert and oriented to person, place, and time.    Psychiatric: Mood and Affect: Mood normal.         Behavior: Behavior normal.         Results for orders placed or performed during the hospital encounter of 10/21/22   CBC WITH AUTOMATED DIFF   Result Value Ref Range    WBC 5.7 4.1 - 11.1 K/uL    RBC 4.60 4.10 - 5.70 M/uL    HGB 13.6 12.1 - 17.0 g/dL    HCT 39.7 36.6 - 50.3 %    MCV 86.3 80.0 - 99.0 FL    MCH 29.6 26.0 - 34.0 PG    MCHC 34.3 30.0 - 36.5 g/dL    RDW 13.0 11.5 - 14.5 %    PLATELET 158 689 - 870 K/uL    MPV 8.4 (L) 8.9 - 12.9 FL    NRBC 0.0 0.0  WBC    ABSOLUTE NRBC 0.00 0.00 - 0.01 K/uL    NEUTROPHILS 65 32 - 75 %    LYMPHOCYTES 20 12 - 49 %    MONOCYTES 9 5 - 13 %    EOSINOPHILS 6 0 - 7 %    BASOPHILS 1 0 - 1 %    IMMATURE GRANULOCYTES 0 0 - 0.5 %    ABS. NEUTROPHILS 3.7 1.8 - 8.0 K/UL    ABS. LYMPHOCYTES 1.1 0.8 - 3.5 K/UL    ABS. MONOCYTES 0.5 0.0 - 1.0 K/UL    ABS. EOSINOPHILS 0.3 0.0 - 0.4 K/UL    ABS. BASOPHILS 0.0 0.0 - 0.1 K/UL    ABS. IMM.  GRANS. 0.0 0.00 - 0.04 K/UL    DF AUTOMATED     METABOLIC PANEL, BASIC   Result Value Ref Range    Sodium 140 136 - 145 mmol/L    Potassium 3.6 3.5 - 5.1 mmol/L    Chloride 106 97 - 108 mmol/L    CO2 26 21 - 32 mmol/L    Anion gap 8 5 - 15 mmol/L    Glucose 123 (H) 65 - 100 mg/dL    BUN 13 6 - 20 MG/DL    Creatinine 1.07 0.70 - 1.30 MG/DL    BUN/Creatinine ratio 12 12 - 20      eGFR >60 >60 ml/min/1.73m2    Calcium 8.8 8.5 - 10.1 MG/DL   TROPONIN-HIGH SENSITIVITY   Result Value Ref Range    Troponin-High Sensitivity 8 0 - 76 ng/L   EKG, 12 LEAD, INITIAL   Result Value Ref Range    Ventricular Rate 69 BPM    Atrial Rate 69 BPM    P-R Interval 126 ms    QRS Duration 84 ms    Q-T Interval 412 ms    QTC Calculation (Bezet) 441 ms    Calculated P Axis 32 degrees    Calculated R Axis 8 degrees    Calculated T Axis 12 degrees    Diagnosis       ** Poor data quality, interpretation may be adversely affected  Normal sinus rhythm  Minimal voltage criteria for LVH, may be normal variant  Borderline ECG  When compared with ECG of 17-FEB-2020 18:58,  No significant change was found  Confirmed by Pam Laird MD, Zoe Bullard ([id number]) on 10/22/2022 7:20:54 AM         No results found for this visit on 10/24/22. Verbal and written instructions (see AVS) provided. Patient expresses understanding of diagnosis and treatment plan. Patient has been advised to contact practice or seek care if condition persists or worsens.      Dusty Agudelo NP

## 2022-10-24 NOTE — PROGRESS NOTES
Identified pt with two pt identifiers(name and ). Reviewed record in preparation for visit and have obtained necessary documentation. Chief Complaint   Patient presents with    Shortness of Breath     ER Follow-up       1. \"Have you been to the ER, urgent care clinic since your last visit? Hospitalized since your last visit? \" Yes Where: [facility]    2. \"Have you seen or consulted any other health care providers outside of the [address] [place] [address] since your last visit? \" No     3. For patients aged 39-70: Has the patient had a colonoscopy / FIT/ Cologuard? No      If the patient is female:    4. For patients aged 41-77: Has the patient had a mammogram within the past 2 years? NA - based on age or sex      11. For patients aged 21-65: Has the patient had a pap smear?  NA - based on age or sex

## 2022-10-27 ENCOUNTER — TELEPHONE (OUTPATIENT)
Dept: FAMILY MEDICINE CLINIC | Age: 52
End: 2022-10-27

## 2022-10-27 NOTE — TELEPHONE ENCOUNTER
Pt went to work today. Needs note for work today to return to work.  Needs to be faxed to [phone] Atten: Jorge Richards

## 2022-11-01 NOTE — PROGRESS NOTES
Adri Flynn is a 46 y.o. male who presents today with c/o   Chief Complaint   Patient presents with    Cough     Assessment/ Plan:         ICD-10-CM ICD-9-CM    1. Acute cough  R05.1 786.2 REFERRAL TO PULMONARY DISEASE      2. Wheezing  R06.2 786.07 albuterol (PROVENTIL HFA, VENTOLIN HFA, PROAIR HFA) 90 mcg/actuation inhaler      3. Community acquired pneumonia of right lower lobe of lung  J18.9 486         Vitals are stable today   Prior to chest XR on 11/2/22 he was treated with levaquin PO X 7 days  11/2/22 Chest XR ->underlying right lung base are persistent ill-defined rounded areas of opacity  I have referred him to pulmonology for recommendations. Start prednisone  Start trial of symbicort for possible asthma symptoms  Continue albuterol rescue inhaler  Complete CT 11/10/22 -will will call with results    Orders Placed This Encounter    REFERRAL TO PULMONARY DISEASE     Referral Priority:   Routine     Referral Type:   Consultation     Referral Reason:   Specialty Services Required     Referred to Provider:   Neva Guevara MD     Number of Visits Requested:   1    predniSONE (STERAPRED DS) 10 mg dose pack     Sig: See administration instruction per 10mg dose pack     Dispense:  21 Tablet     Refill:  0    budesonide-formoteroL (SYMBICORT) 80-4.5 mcg/actuation HFAA     Sig: Take 2 Puffs by inhalation two (2) times a day. Dispense:  10.2 g     Refill:  1    albuterol (PROVENTIL VENTOLIN) 2.5 mg /3 mL (0.083 %) nebu     Sig: 3 mL by Nebulization route every four (4) hours as needed for Wheezing. Dispense:  30 Each     Refill:  0     1 box please    albuterol (PROVENTIL HFA, VENTOLIN HFA, PROAIR HFA) 90 mcg/actuation inhaler     Sig: Take 2 Puffs by inhalation every four (4) hours as needed for Wheezing or Shortness of Breath. Dispense:  18 g     Refill:  1       Follow-up and Dispositions    Return in about 1 month (around 12/8/2022).          Subjective:  Adri Flynn is a 46 y.o. male here for ongoing cough. Recently started working for a [employer]. My first time seeing the patient was 22 at which time he complained of a cough X 1 week with productive yellow sputum. Cough and flu were negative at this time. He did not have health insurance so he wanted to wait on the chest XR. Therefore he was treated for possible bronchitis with doxycyline, tessalon, albuterol and prednisone. He then returned on 10/19/22 once he had health insurance. He admitted to not completed the course of doxycycline and we completed an XR at that time. XR on 10/19/22 showed a possible PNA in his RLL. He was therefore advised to start taking levaquin. He later started experieincing SOB so he went to the ER on 10/21/22 and was advised to start albuterol nebulizers, albuterol and continue the levaquin. 02 sat in the ER was 96%. CBC in the ER was unremarkable for an elevated WBC. Troponin negative in the ER. He then had follow up in our office on 10/24/22. He was advised to continue the levaquin and breathing treatments and follow up in our office in 3 weeks. He called our office on 22 stating he went back to work, but he is still coughing all the time. A repeat chest XR was ordered to evaluate the infection which showed the followin22 chest XR->Projecting at the right lung base are persistent ill-defined rounded areas of opacity. This could represent nodular airspace disease this could represent nodules from other cause. Largest area measures approximately 2 cm smaller area measures approximately 1.4 cm. These have not changed significantly. CT of the chest is suggested. A CT scan with contrast was then ordered to evaluate his lungs which is scheduled for 11/10/22. Today, he is very frustrated that he is still coughing.  Feels we need to 'step our game up.' I advised him that my first encounter with him was 22 and I have completed multiple chest Xrays and treated him for PNA and once the CT results I will have more information regarding treatment of his RLL. I asked him to again review with me his history for any new pertinent information that we are missing. He admits to smoking marijuana on occasion. Denies any other drug use. He does admit to having asthma as a kid, but he grew out of the asthma. I do have a high suspicion that he has asthma and his new job working as a  and using the chemicals has irritated his asthma. I will refer him to pulmonology for recommendations today. I will also order Symbicort and a prednisone taper. He has not had a fever and his most recent CBC did not show an elevated white count. Lab Results   Component Value Date/Time    WBC 5.7 10/21/2022 08:38 AM    HGB 13.6 10/21/2022 08:38 AM    HCT 39.7 10/21/2022 08:38 AM    PLATELET 069 92/37/9338 08:38 AM    MCV 86.3 10/21/2022 08:38 AM         Neuropathy  Of note, his vitamin B12 level came back really high and I will repeat this on his next appointment to confirm because I am not sure of the cause of his elevated B12 level. He does not taking B12 supplements. Will recheck B12 again as a nurse visit once we have resolved his lung issues.    Lab Results   Component Value Date/Time    Vitamin B12 >2000 (H) 10/19/2022 07:20 AM         Patient Active Problem List   Diagnosis Code    Intractable chronic migraine without aura and without status migrainosus G43.719    H/O head injury Z87.828    Other constipation K59.09    Chronic pain in left shoulder M25.512, G89.29    History of renal stone Z87.442       Past Medical History:   Diagnosis Date    Blurred vision     Head injuries 2012    Headache          Current Outpatient Medications:     predniSONE (STERAPRED DS) 10 mg dose pack, See administration instruction per 10mg dose pack, Disp: 21 Tablet, Rfl: 0    budesonide-formoteroL (SYMBICORT) 80-4.5 mcg/actuation HFAA, Take 2 Puffs by inhalation two (2) times a day., Disp: 10.2 g, Rfl: 1    albuterol (PROVENTIL VENTOLIN) 2.5 mg /3 mL (0.083 %) nebu, 3 mL by Nebulization route every four (4) hours as needed for Wheezing., Disp: 30 Each, Rfl: 0    albuterol (PROVENTIL HFA, VENTOLIN HFA, PROAIR HFA) 90 mcg/actuation inhaler, Take 2 Puffs by inhalation every four (4) hours as needed for Wheezing or Shortness of Breath., Disp: 18 g, Rfl: 1    traZODone (DESYREL) 50 mg tablet, Take 1 Tablet by mouth nightly. Indications: insomnia, Disp: 30 Tablet, Rfl: 0    Nebulizer & Compressor machine, 1 Each by Does Not Apply route every four (4) hours as needed for Wheezing or Shortness of Breath. As directed, Disp: 1 Each, Rfl: 0    gabapentin (NEURONTIN) 300 mg capsule, Take 1 Capsule by mouth nightly. Max Daily Amount: 300 mg., Disp: 30 Capsule, Rfl: 0    No Known Allergies    Past Surgical History:   Procedure Laterality Date    HX COLONOSCOPY  2019    normal, repeat in 5 years       Social History     Tobacco Use   Smoking Status Never   Smokeless Tobacco Never       Social History     Socioeconomic History    Marital status:    Tobacco Use    Smoking status: Never    Smokeless tobacco: Never   Vaping Use    Vaping Use: Never used   Substance and Sexual Activity    Alcohol use: Not Currently    Drug use: Never    Sexual activity: Yes     Social Determinants of Health     Financial Resource Strain: Medium Risk    Difficulty of Paying Living Expenses: Somewhat hard   Food Insecurity: Food Insecurity Present    Worried About Running Out of Food in the Last Year: Sometimes true    Ran Out of Food in the Last Year: Sometimes true       Family History   Problem Relation Age of Onset    Cancer Mother     Heart Disease Father        ROS:  Review of Systems   Constitutional:  Negative for chills and fever. HENT:  Negative for hearing loss. Eyes:  Negative for blurred vision and double vision. Respiratory:  Positive for cough, sputum production and shortness of breath (relieved with albuterol).     Cardiovascular:  Negative for chest pain and palpitations. Gastrointestinal:  Negative for heartburn. Genitourinary:  Negative for dysuria. Musculoskeletal:  Negative for myalgias. Skin:  Negative for rash. Neurological:  Negative for dizziness. Psychiatric/Behavioral:  Negative for depression. Objective:     Visit Vitals  /80 (BP 1 Location: Left upper arm)   Pulse 78   Temp 98.1 °F (36.7 °C) (Oral)   Resp 16   Ht 5' 10\" (1.778 m)   Wt 177 lb 12.8 oz (80.6 kg)   SpO2 98%   BMI 25.51 kg/m²     Body mass index is 25.51 kg/m². Physical Exam  Vitals reviewed. Constitutional:       General: He is not in acute distress. Appearance: He is not ill-appearing or toxic-appearing. HENT:      Head: Normocephalic. Right Ear: External ear normal.      Left Ear: External ear normal.      Nose: Nose normal.      Mouth/Throat:      Mouth: Mucous membranes are moist.   Eyes:      Pupils: Pupils are equal, round, and reactive to light. Cardiovascular:      Rate and Rhythm: Normal rate. Pulses: Normal pulses. Pulmonary:      Effort: Pulmonary effort is normal.      Breath sounds: Wheezing present. No rhonchi. Abdominal:      General: Abdomen is flat. Musculoskeletal:         General: Normal range of motion. Cervical back: Normal range of motion. Skin:     General: Skin is warm. Neurological:      Mental Status: He is alert and oriented to person, place, and time.    Psychiatric:         Mood and Affect: Mood normal.         Behavior: Behavior normal.         Results for orders placed or performed during the hospital encounter of 10/21/22   CBC WITH AUTOMATED DIFF   Result Value Ref Range    WBC 5.7 4.1 - 11.1 K/uL    RBC 4.60 4.10 - 5.70 M/uL    HGB 13.6 12.1 - 17.0 g/dL    HCT 39.7 36.6 - 50.3 %    MCV 86.3 80.0 - 99.0 FL    MCH 29.6 26.0 - 34.0 PG    MCHC 34.3 30.0 - 36.5 g/dL    RDW 13.0 11.5 - 14.5 %    PLATELET 031 648 - 077 K/uL    MPV 8.4 (L) 8.9 - 12.9 FL    NRBC 0.0 0.0  WBC    ABSOLUTE NRBC 0.00 0.00 - 0.01 K/uL    NEUTROPHILS 65 32 - 75 %    LYMPHOCYTES 20 12 - 49 %    MONOCYTES 9 5 - 13 %    EOSINOPHILS 6 0 - 7 %    BASOPHILS 1 0 - 1 %    IMMATURE GRANULOCYTES 0 0 - 0.5 %    ABS. NEUTROPHILS 3.7 1.8 - 8.0 K/UL    ABS. LYMPHOCYTES 1.1 0.8 - 3.5 K/UL    ABS. MONOCYTES 0.5 0.0 - 1.0 K/UL    ABS. EOSINOPHILS 0.3 0.0 - 0.4 K/UL    ABS. BASOPHILS 0.0 0.0 - 0.1 K/UL    ABS. IMM. GRANS. 0.0 0.00 - 0.04 K/UL    DF AUTOMATED     METABOLIC PANEL, BASIC   Result Value Ref Range    Sodium 140 136 - 145 mmol/L    Potassium 3.6 3.5 - 5.1 mmol/L    Chloride 106 97 - 108 mmol/L    CO2 26 21 - 32 mmol/L    Anion gap 8 5 - 15 mmol/L    Glucose 123 (H) 65 - 100 mg/dL    BUN 13 6 - 20 MG/DL    Creatinine 1.07 0.70 - 1.30 MG/DL    BUN/Creatinine ratio 12 12 - 20      eGFR >60 >60 ml/min/1.73m2    Calcium 8.8 8.5 - 10.1 MG/DL   TROPONIN-HIGH SENSITIVITY   Result Value Ref Range    Troponin-High Sensitivity 8 0 - 76 ng/L   EKG, 12 LEAD, INITIAL   Result Value Ref Range    Ventricular Rate 69 BPM    Atrial Rate 69 BPM    P-R Interval 126 ms    QRS Duration 84 ms    Q-T Interval 412 ms    QTC Calculation (Bezet) 441 ms    Calculated P Axis 32 degrees    Calculated R Axis 8 degrees    Calculated T Axis 12 degrees    Diagnosis       ** Poor data quality, interpretation may be adversely affected  Normal sinus rhythm  Minimal voltage criteria for LVH, may be normal variant  Borderline ECG  When compared with ECG of 17-FEB-2020 18:58,  No significant change was found  Confirmed by Eda Hoang MD, [facility] ([id number]) on 10/22/2022 7:20:54 AM         No results found for this visit on 11/08/22. Verbal and written instructions (see AVS) provided. Patient expresses understanding of diagnosis and treatment plan. Follow-up and Dispositions    Return in about 1 month (around 12/8/2022). Patient has been advised to contact practice or seek care if condition persists or worsens.      Joleen Wray NP

## 2022-11-02 ENCOUNTER — TELEPHONE (OUTPATIENT)
Dept: FAMILY MEDICINE CLINIC | Age: 52
End: 2022-11-02

## 2022-11-02 DIAGNOSIS — R05.9 COUGH, UNSPECIFIED TYPE: Primary | ICD-10-CM

## 2022-11-02 NOTE — TELEPHONE ENCOUNTER
Pts wife stated he went back to work thursday but he's not better.  Pt is wanting to know what you suggest

## 2022-11-03 ENCOUNTER — HOSPITAL ENCOUNTER (OUTPATIENT)
Dept: GENERAL RADIOLOGY | Age: 52
Discharge: HOME | End: 2022-11-03
Payer: COMMERCIAL

## 2022-11-03 DIAGNOSIS — R05.9 COUGH, UNSPECIFIED TYPE: ICD-10-CM

## 2022-11-03 PROCEDURE — 71046 X-RAY EXAM CHEST 2 VIEWS: CPT

## 2022-11-04 DIAGNOSIS — R91.8 OPACITY OF LUNG ON IMAGING STUDY: Primary | ICD-10-CM

## 2022-11-04 NOTE — PROGRESS NOTES
We need to complete a CT scan to obtain a better image of his lungs since he is still not feeling well. The radiologist recommends a better scan to look at the right lung. I have placed the CT scan order for his lungs--Can we give him the scheduling number.

## 2022-11-08 ENCOUNTER — OFFICE VISIT (OUTPATIENT)
Dept: FAMILY MEDICINE CLINIC | Age: 52
End: 2022-11-08
Payer: COMMERCIAL

## 2022-11-08 ENCOUNTER — DOCUMENTATION ONLY (OUTPATIENT)
Dept: FAMILY MEDICINE CLINIC | Age: 52
End: 2022-11-08

## 2022-11-08 VITALS
TEMPERATURE: 98.1 F | HEART RATE: 78 BPM | RESPIRATION RATE: 16 BRPM | WEIGHT: 177.8 LBS | HEIGHT: 70 IN | SYSTOLIC BLOOD PRESSURE: 118 MMHG | DIASTOLIC BLOOD PRESSURE: 80 MMHG | OXYGEN SATURATION: 98 % | BODY MASS INDEX: 25.45 KG/M2

## 2022-11-08 DIAGNOSIS — R06.2 WHEEZING: ICD-10-CM

## 2022-11-08 DIAGNOSIS — R05.1 ACUTE COUGH: Primary | ICD-10-CM

## 2022-11-08 DIAGNOSIS — J18.9 COMMUNITY ACQUIRED PNEUMONIA OF RIGHT LOWER LOBE OF LUNG: ICD-10-CM

## 2022-11-08 PROCEDURE — 99213 OFFICE O/P EST LOW 20 MIN: CPT | Performed by: NURSE PRACTITIONER

## 2022-11-08 RX ORDER — BUDESONIDE AND FORMOTEROL FUMARATE DIHYDRATE 80; 4.5 UG/1; UG/1
2 AEROSOL RESPIRATORY (INHALATION) 2 TIMES DAILY
Qty: 10.2 G | Refills: 1 | Status: SHIPPED | OUTPATIENT
Start: 2022-11-08

## 2022-11-08 RX ORDER — ALBUTEROL SULFATE 90 UG/1
2 AEROSOL, METERED RESPIRATORY (INHALATION)
Qty: 18 G | Refills: 1 | Status: SHIPPED | OUTPATIENT
Start: 2022-11-08

## 2022-11-08 RX ORDER — ALBUTEROL SULFATE 0.83 MG/ML
2.5 SOLUTION RESPIRATORY (INHALATION)
Qty: 30 EACH | Refills: 0 | Status: SHIPPED | OUTPATIENT
Start: 2022-11-08

## 2022-11-08 RX ORDER — PREDNISONE 10 MG/1
TABLET ORAL
Qty: 21 TABLET | Refills: 0 | Status: SHIPPED | OUTPATIENT
Start: 2022-11-08

## 2022-11-08 NOTE — PROGRESS NOTES
Identified pt with two pt identifiers(name and ). Reviewed record in preparation for visit and have obtained necessary documentation. Chief Complaint   Patient presents with    Cough       1. \"Have you been to the ER, urgent care clinic since your last visit? Hospitalized since your last visit? \" No    2. \"Have you seen or consulted any other health care providers outside of the [address] [place] [address] since your last visit? \" No     3. For patients aged 39-70: Has the patient had a colonoscopy / FIT/ Cologuard? No      If the patient is female:    4. For patients aged 41-77: Has the patient had a mammogram within the past 2 years? NA - based on age or sex      11. For patients aged 21-65: Has the patient had a pap smear?  NA - based on age or sex

## 2022-11-10 ENCOUNTER — HOSPITAL ENCOUNTER (OUTPATIENT)
Dept: CT IMAGING | Age: 52
Discharge: HOME | End: 2022-11-10
Attending: NURSE PRACTITIONER
Payer: COMMERCIAL

## 2022-11-10 DIAGNOSIS — R91.8 OPACITY OF LUNG ON IMAGING STUDY: ICD-10-CM

## 2022-11-10 PROCEDURE — 74011000636 HC RX REV CODE- 636: Performed by: NURSE PRACTITIONER

## 2022-11-10 PROCEDURE — 71260 CT THORAX DX C+: CPT

## 2022-11-10 RX ADMIN — IOPAMIDOL 100 ML: 755 INJECTION, SOLUTION INTRAVENOUS at 15:30

## 2022-11-10 NOTE — NURSE NAVIGATOR
Called by tech to eval pt s/p iv contrast.  Pt presents with flushed face, and c/o SOB. No cough, 99% spo2 on RA. No retracting, or dyspnea note. Able to speak in full sentences, temp 98.3po, RR 18, HR 67 139/90  Recent RX with pneumonia \"onset Sept and still have cough at night\"  Using inhaler. Offered ED eval, pt and SO refused. CT was for cough and SOB. Repeat vs 141/87 66 985 18. Informed to go to ED for eval if situation becomes worst and they stated they would.   Also informed them to check on CT results on my chart and notify PCP for results     Pt face not flushed and he states he fells better

## 2022-11-11 DIAGNOSIS — R91.8 PULMONARY NODULES: Primary | ICD-10-CM

## 2022-11-11 NOTE — PROGRESS NOTES
Left voicemail for patients wife. CT of chest represents a infectious/inflammatory process. This is most likely from the pneumonia that we have already treated. This can take time for the lungs to heal.    Recommend repeating the chest CT scan in about 2-4 weeks to make sure this is resolving.

## 2022-11-15 NOTE — PROGRESS NOTES
LVM for patients wife--recommend another CT in 2 weeks--order is in Epic. Patient advised to call and make appt and follow-up with pulmonology.

## 2023-03-12 ENCOUNTER — HOSPITAL ENCOUNTER (EMERGENCY)
Age: 53
Discharge: HOME | End: 2023-03-13
Attending: EMERGENCY MEDICINE | Admitting: EMERGENCY MEDICINE

## 2023-03-12 ENCOUNTER — APPOINTMENT (OUTPATIENT)
Dept: CT IMAGING | Age: 53
End: 2023-03-12
Attending: EMERGENCY MEDICINE

## 2023-03-12 VITALS
HEART RATE: 93 BPM | HEIGHT: 70 IN | DIASTOLIC BLOOD PRESSURE: 92 MMHG | TEMPERATURE: 99.2 F | SYSTOLIC BLOOD PRESSURE: 147 MMHG | RESPIRATION RATE: 19 BRPM | BODY MASS INDEX: 26.34 KG/M2 | OXYGEN SATURATION: 98 % | WEIGHT: 184 LBS

## 2023-03-12 DIAGNOSIS — J20.9 ACUTE BRONCHITIS, UNSPECIFIED ORGANISM: Primary | ICD-10-CM

## 2023-03-12 LAB
ALBUMIN SERPL-MCNC: 3.6 G/DL (ref 3.5–5)
ALBUMIN/GLOB SERPL: 0.9 (ref 1.1–2.2)
ALP SERPL-CCNC: 90 U/L (ref 45–117)
ALT SERPL-CCNC: 55 U/L (ref 12–78)
ANION GAP SERPL CALC-SCNC: 7 MMOL/L (ref 5–15)
AST SERPL-CCNC: 38 U/L (ref 15–37)
BASOPHILS # BLD: 0.1 K/UL (ref 0–0.1)
BASOPHILS NFR BLD: 1 % (ref 0–1)
BILIRUB SERPL-MCNC: 0.5 MG/DL (ref 0.2–1)
BUN SERPL-MCNC: 8 MG/DL (ref 6–20)
BUN/CREAT SERPL: 8 (ref 12–20)
CALCIUM SERPL-MCNC: 9.6 MG/DL (ref 8.5–10.1)
CHLORIDE SERPL-SCNC: 103 MMOL/L (ref 97–108)
CO2 SERPL-SCNC: 30 MMOL/L (ref 21–32)
CREAT SERPL-MCNC: 1 MG/DL (ref 0.7–1.3)
D DIMER PPP FEU-MCNC: 0.79 MG/L FEU (ref 0–0.65)
DIFFERENTIAL METHOD BLD: ABNORMAL
EOSINOPHIL # BLD: 2.4 K/UL (ref 0–0.4)
EOSINOPHIL NFR BLD: 21 % (ref 0–7)
ERYTHROCYTE [DISTWIDTH] IN BLOOD BY AUTOMATED COUNT: 13.7 % (ref 11.5–14.5)
GLOBULIN SER CALC-MCNC: 4.1 G/DL (ref 2–4)
GLUCOSE SERPL-MCNC: 90 MG/DL (ref 65–100)
HCT VFR BLD AUTO: 42.9 % (ref 36.6–50.3)
HGB BLD-MCNC: 14.7 G/DL (ref 12.1–17)
IMM GRANULOCYTES # BLD AUTO: 0 K/UL (ref 0–0.04)
IMM GRANULOCYTES NFR BLD AUTO: 0 % (ref 0–0.5)
LYMPHOCYTES # BLD: 1.3 K/UL (ref 0.8–3.5)
LYMPHOCYTES NFR BLD: 11 % (ref 12–49)
MAGNESIUM SERPL-MCNC: 1.9 MG/DL (ref 1.6–2.4)
MCH RBC QN AUTO: 29.2 PG (ref 26–34)
MCHC RBC AUTO-ENTMCNC: 34.3 G/DL (ref 30–36.5)
MCV RBC AUTO: 85.3 FL (ref 80–99)
MONOCYTES # BLD: 0.9 K/UL (ref 0–1)
MONOCYTES NFR BLD: 8 % (ref 5–13)
NEUTS BAND NFR BLD MANUAL: 3 %
NEUTS SEG # BLD: 6.8 K/UL (ref 1.8–8)
NEUTS SEG NFR BLD: 56 % (ref 32–75)
NRBC # BLD: 0 K/UL (ref 0–0.01)
NRBC BLD-RTO: 0 PER 100 WBC
PLATELET # BLD AUTO: 301 K/UL (ref 150–400)
PMV BLD AUTO: 8.6 FL (ref 8.9–12.9)
POTASSIUM SERPL-SCNC: 4.5 MMOL/L (ref 3.5–5.1)
PROT SERPL-MCNC: 7.7 G/DL (ref 6.4–8.2)
RBC # BLD AUTO: 5.03 M/UL (ref 4.1–5.7)
RBC MORPH BLD: ABNORMAL
RBC MORPH BLD: ABNORMAL
SODIUM SERPL-SCNC: 140 MMOL/L (ref 136–145)
WBC # BLD AUTO: 11.5 K/UL (ref 4.1–11.1)

## 2023-03-12 PROCEDURE — 83735 ASSAY OF MAGNESIUM: CPT

## 2023-03-12 PROCEDURE — 80053 COMPREHEN METABOLIC PANEL: CPT

## 2023-03-12 PROCEDURE — 74011250636 HC RX REV CODE- 250/636: Performed by: EMERGENCY MEDICINE

## 2023-03-12 PROCEDURE — 99285 EMERGENCY DEPT VISIT HI MDM: CPT | Performed by: EMERGENCY MEDICINE

## 2023-03-12 PROCEDURE — 93005 ELECTROCARDIOGRAM TRACING: CPT

## 2023-03-12 PROCEDURE — 96374 THER/PROPH/DIAG INJ IV PUSH: CPT | Performed by: EMERGENCY MEDICINE

## 2023-03-12 PROCEDURE — 74011000636 HC RX REV CODE- 636: Performed by: EMERGENCY MEDICINE

## 2023-03-12 PROCEDURE — 85025 COMPLETE CBC W/AUTO DIFF WBC: CPT

## 2023-03-12 PROCEDURE — 94640 AIRWAY INHALATION TREATMENT: CPT

## 2023-03-12 PROCEDURE — 74011000250 HC RX REV CODE- 250: Performed by: EMERGENCY MEDICINE

## 2023-03-12 PROCEDURE — 71275 CT ANGIOGRAPHY CHEST: CPT

## 2023-03-12 PROCEDURE — 36415 COLL VENOUS BLD VENIPUNCTURE: CPT

## 2023-03-12 PROCEDURE — 85379 FIBRIN DEGRADATION QUANT: CPT

## 2023-03-12 RX ORDER — IPRATROPIUM BROMIDE AND ALBUTEROL SULFATE 2.5; .5 MG/3ML; MG/3ML
3 SOLUTION RESPIRATORY (INHALATION) ONCE
Status: COMPLETED | OUTPATIENT
Start: 2023-03-12 | End: 2023-03-12

## 2023-03-12 RX ORDER — IPRATROPIUM BROMIDE AND ALBUTEROL SULFATE 2.5; .5 MG/3ML; MG/3ML
3 SOLUTION RESPIRATORY (INHALATION)
Qty: 30 EACH | Refills: 0 | Status: SHIPPED | OUTPATIENT
Start: 2023-03-12

## 2023-03-12 RX ORDER — CETIRIZINE HYDROCHLORIDE 10 MG/1
10 TABLET ORAL DAILY
Qty: 20 TABLET | Refills: 0 | Status: SHIPPED | OUTPATIENT
Start: 2023-03-12

## 2023-03-12 RX ORDER — PREDNISONE 10 MG/1
30 TABLET ORAL
Qty: 15 TABLET | Refills: 0 | Status: SHIPPED | OUTPATIENT
Start: 2023-03-13 | End: 2023-03-18

## 2023-03-12 RX ORDER — SODIUM CHLORIDE 0.9 % (FLUSH) 0.9 %
5-10 SYRINGE (ML) INJECTION ONCE
Status: DISCONTINUED | OUTPATIENT
Start: 2023-03-12 | End: 2023-03-13 | Stop reason: HOSPADM

## 2023-03-12 RX ADMIN — IOPAMIDOL 100 ML: 755 INJECTION, SOLUTION INTRAVENOUS at 22:58

## 2023-03-12 RX ADMIN — METHYLPREDNISOLONE SODIUM SUCCINATE 125 MG: 125 INJECTION, POWDER, FOR SOLUTION INTRAMUSCULAR; INTRAVENOUS at 22:03

## 2023-03-12 RX ADMIN — IPRATROPIUM BROMIDE AND ALBUTEROL SULFATE 3 ML: 2.5; .5 SOLUTION RESPIRATORY (INHALATION) at 22:09

## 2023-03-12 NOTE — Clinical Note
[address] [place] [address] EMERGENCY DEP  [address] [facility] [address] [facility] [address] [place] [id number]  [phone]    Work/School Note    Date: 3/12/2023    To Whom It May concern:    Jacqueline Crook was seen and treated today in the emergency room by the following provider(s):  Attending Provider: Toñito Marrufo MD.      Jacqueline Crook is excused from work/school on 03/12/23 and 03/13/23. He is medically clear to return to work/school on 3/14/2023. Sincerely,          Yolis Vogel.  MD Christoph

## 2023-03-13 PROCEDURE — 74011000250 HC RX REV CODE- 250: Performed by: EMERGENCY MEDICINE

## 2023-03-13 PROCEDURE — 74011250637 HC RX REV CODE- 250/637: Performed by: EMERGENCY MEDICINE

## 2023-03-13 RX ORDER — ALBUTEROL SULFATE 0.83 MG/ML
5 SOLUTION RESPIRATORY (INHALATION)
Status: COMPLETED | OUTPATIENT
Start: 2023-03-13 | End: 2023-03-13

## 2023-03-13 RX ORDER — OXYMETAZOLINE HCL 0.05 %
2 SPRAY, NON-AEROSOL (ML) NASAL
Status: COMPLETED | OUTPATIENT
Start: 2023-03-13 | End: 2023-03-13

## 2023-03-13 RX ADMIN — NASAL DECONGESTANT 2 SPRAY: 0.05 SPRAY NASAL at 00:09

## 2023-03-13 RX ADMIN — ALBUTEROL SULFATE 5 MG: 2.5 SOLUTION RESPIRATORY (INHALATION) at 00:20

## 2023-03-13 NOTE — DISCHARGE INSTRUCTIONS
You were seen in the emergency room for shortness of breath. Your scan does not show pneumonia it shows the same inflammation that you have had for the past 6 months in your right lower lung. If you would like to take the antibiotics you are prescription prescribed you can continue them, but it is not going to help the situation. You need to see a lung specialist.  I have sent some new prescriptions over to your pharmacy for you. If the Spiriva is too expensive, you may be able to get samples from the pulmonology office. Take the Zyrtec every day and use the new albuterol combination at least once a day every night before you go to bed, and anytime you feel extremely short of breath.

## 2023-03-13 NOTE — ED PROVIDER NOTES
[facility] EMERGENCY DEP  EMERGENCY DEPARTMENT ENCOUNTER       Pt Name: Erika Pineda  MRN: [id number]  [place] 1970  Date of evaluation: 3/12/2023  Provider: Kristal Hawthorne MD   PCP: Bailey Vidal NP  Note Started: 10:19 PM 3/12/23     CHIEF COMPLAINT       Chief Complaint   Patient presents with    Shortness of Breath        HISTORY OF PRESENT ILLNESS: 1 or more elements      History From: Patient and EMS  HPI Limitations : None     Erika Pineda is a 48 y.o. male who presents via ambulance for shortness of breath. Patient states he went outside to take care of his dogs and when he went back in the house he was acutely short of breath. He tried using his albuterol nebulizer without relief. On 9 1 on arrival he was noted to have oxygen percent. He was transported without event. Patient states he has been sick with a cough for the past few months. He went to urgent care couple of days ago and was prescribed antibiotics and has been using his albuterol nebulizer 3 times a day. Patient denies any fevers, chills, nausea, vomiting, diarrhea with his symptoms but notes he does have some right lower rib chest pain which she associates with too much coughing. He was seen in a local urgent care 3 days ago, given antibiotic prescription cough syrup which have not really improved his symptoms. He states he does feel better now after the nebulizer treatment provided by the paramedics. Nursing Notes were all reviewed and agreed with or any disagreements were addressed in the HPI. REVIEW OF SYSTEMS      Review of Systems   Constitutional:  Negative for activity change, appetite change, chills, fever and unexpected weight change. HENT:  Negative for congestion. Eyes:  Negative for pain and visual disturbance. Respiratory:  Positive for cough, shortness of breath and wheezing. Cardiovascular:  Negative for chest pain.    Gastrointestinal:  Negative for abdominal pain, diarrhea, nausea and vomiting. Genitourinary:  Negative for dysuria. Musculoskeletal:  Negative for back pain. Skin:  Negative for rash. Neurological:  Negative for headaches. Positives and Pertinent negatives as per HPI. PAST HISTORY     Past Medical History:  Past Medical History:   Diagnosis Date    Blurred vision     Head injuries 2012    Headache        Past Surgical History:  Past Surgical History:   Procedure Laterality Date    HX COLONOSCOPY  2019    normal, repeat in 5 years       Family History:  Family History   Problem Relation Age of Onset    Cancer Mother     Heart Disease Father        Social History:  Social History     Tobacco Use    Smoking status: Never    Smokeless tobacco: Never   Vaping Use    Vaping Use: Never used   Substance Use Topics    Alcohol use: Not Currently    Drug use: Never       Allergies:  No Known Allergies    CURRENT MEDICATIONS      Previous Medications    ALBUTEROL (PROVENTIL HFA, VENTOLIN HFA, PROAIR HFA) 90 MCG/ACTUATION INHALER    Take 2 Puffs by inhalation every four (4) hours as needed for Wheezing or Shortness of Breath. ALBUTEROL (PROVENTIL VENTOLIN) 2.5 MG /3 ML (0.083 %) NEBU    3 mL by Nebulization route every four (4) hours as needed for Wheezing. BUDESONIDE-FORMOTEROL (SYMBICORT) 80-4.5 MCG/ACTUATION HFAA    Take 2 Puffs by inhalation two (2) times a day. GABAPENTIN (NEURONTIN) 300 MG CAPSULE    Take 1 Capsule by mouth nightly. Max Daily Amount: 300 mg. NEBULIZER & COMPRESSOR MACHINE    1 Each by Does Not Apply route every four (4) hours as needed for Wheezing or Shortness of Breath. As directed    TRAZODONE (DESYREL) 50 MG TABLET    Take 1 Tablet by mouth nightly.  Indications: insomnia        PHYSICAL EXAM      ED Triage Vitals [03/12/23 2144]   ED Encounter Vitals Group      BP (!) 154/106      Pulse (Heart Rate) 80      Resp Rate 24      Temp 99.2 °F (37.3 °C)      Temp src       O2 Sat (%) 95 %      Weight 184 lb      Height 5' 10\"        Physical Exam  Vitals and nursing note reviewed. Constitutional:       Appearance: He is well-developed. He is not diaphoretic. Comments: Middle-age male sitting tripod on a gurney in mild respiratory distress   HENT:      Head: Normocephalic and atraumatic. Eyes:      General:         Right eye: No discharge. Left eye: No discharge. Conjunctiva/sclera: Conjunctivae normal.      Pupils: Pupils are equal, round, and reactive to light. Cardiovascular:      Rate and Rhythm: Normal rate and regular rhythm. Heart sounds: Normal heart sounds. No murmur heard. Pulmonary:      Effort: Tachypnea and accessory muscle usage present. No respiratory distress. Breath sounds: Wheezing present. No rhonchi or rales. Abdominal:      General: Bowel sounds are normal. There is no distension. Palpations: Abdomen is soft. Tenderness: There is no abdominal tenderness. Musculoskeletal:         General: Normal range of motion. Cervical back: Normal range of motion and neck supple. Right lower leg: No tenderness. No edema. Left lower leg: No tenderness. No edema. Skin:     General: Skin is warm and dry. Findings: No rash. Neurological:      Mental Status: He is alert and oriented to person, place, and time. Cranial Nerves: No cranial nerve deficit. Motor: No abnormal muscle tone. DIAGNOSTIC RESULTS   LABS:     Recent Results (from the past 12 hour(s))   CBC WITH AUTOMATED DIFF    Collection Time: 03/12/23  9:50 PM   Result Value Ref Range    WBC 11.5 (H) 4.1 - 11.1 K/uL    RBC 5.03 4. 10 - 5.70 M/uL    HGB 14.7 12.1 - 17.0 g/dL    HCT 42.9 36.6 - 50.3 %    MCV 85.3 80.0 - 99.0 FL    MCH 29.2 26.0 - 34.0 PG    MCHC 34.3 30.0 - 36.5 g/dL    RDW 13.7 11.5 - 14.5 %    PLATELET 756 147 - 576 K/uL    MPV 8.6 (L) 8.9 - 12.9 FL    NRBC 0.0 0.0  WBC    ABSOLUTE NRBC 0.00 0.00 - 0.01 K/uL    NEUTROPHILS 56 32 - 75 %    BAND NEUTROPHILS 3 %    LYMPHOCYTES 11 (L) 12 - 49 %    MONOCYTES 8 5 - 13 %    EOSINOPHILS 21 (H) 0 - 7 %    BASOPHILS 1 0 - 1 %    IMMATURE GRANULOCYTES 0 0.0 - 0.5 %    ABS. NEUTROPHILS 6.8 1.8 - 8.0 K/UL    ABS. LYMPHOCYTES 1.3 0.8 - 3.5 K/UL    ABS. MONOCYTES 0.9 0.0 - 1.0 K/UL    ABS. EOSINOPHILS 2.4 (H) 0.0 - 0.4 K/UL    ABS. BASOPHILS 0.1 0.0 - 0.1 K/UL    ABS. IMM. GRANS. 0.0 0.00 - 0.04 K/UL    DF MANUAL      RBC COMMENTS OVALOCYTES  1+        RBC COMMENTS TEARDROP CELLS  1+       METABOLIC PANEL, COMPREHENSIVE    Collection Time: 03/12/23  9:50 PM   Result Value Ref Range    Sodium 140 136 - 145 mmol/L    Potassium 4.5 3.5 - 5.1 mmol/L    Chloride 103 97 - 108 mmol/L    CO2 30 21 - 32 mmol/L    Anion gap 7 5 - 15 mmol/L    Glucose 90 65 - 100 mg/dL    BUN 8 6 - 20 MG/DL    Creatinine 1.00 0.70 - 1.30 MG/DL    BUN/Creatinine ratio 8 (L) 12 - 20      eGFR >60 >60 ml/min/1.73m2    Calcium 9.6 8.5 - 10.1 MG/DL    Bilirubin, total 0.5 0.2 - 1.0 MG/DL    ALT (SGPT) 55 12 - 78 U/L    AST (SGOT) 38 (H) 15 - 37 U/L    Alk. phosphatase 90 45 - 117 U/L    Protein, total 7.7 6.4 - 8.2 g/dL    Albumin 3.6 3.5 - 5.0 g/dL    Globulin 4.1 (H) 2.0 - 4.0 g/dL    A-G Ratio 0.9 (L) 1.1 - 2.2     MAGNESIUM    Collection Time: 03/12/23  9:50 PM   Result Value Ref Range    Magnesium 1.9 1.6 - 2.4 mg/dL   D DIMER    Collection Time: 03/12/23  9:50 PM   Result Value Ref Range    D-dimer 0.79 (H) 0.00 - 0.65 mg/L FEU        EKG: Sinus rhythm at a rate of 96 bpm; normal AL; normal QRS; normal QTc and normal axis. I do not see any evidence of acute ST-T abnormalities. EKG interpreted by ED provider Kateryna Bronson MD      RADIOLOGY:       CTA CHEST W OR W WO CONT    Result Date: 3/12/2023  EXAM: CTA CHEST W OR W WO CONT INDICATION: cough, sob, high d-dimer COMPARISON: CT 11/10/2022. CONTRAST: 100 mL of Isovue-300. TECHNIQUE: Multislice helical CT was performed from the thoracic inlet to the adrenal glands during uneventful rapid bolus intravenous contrast administration. Contiguous 5 mm axial images were reconstructed and lung and soft tissue windows were generated. Coronal and sagittal reformations were generated. CT dose reduction was achieved through use of a standardized protocol tailored for this examination and automatic exposure control for dose modulation. FINDINGS: No significant change in multiple areas of groundglass opacity and nodularity with dominant right lower lobe nodules measuring 1.5 x 1.5 cm in 1.9 x 2.0 cm. The aorta enhances normally with no evidence of aneurysm or dissection. There is no mediastinal or hilar or axillary adenopathy. Coronary artery calcium: absent The visualized portions of the upper abdominal organs are normal.     No significant change in bilateral areas of ground glass opacity or small nodularity bilaterally compared to 5 months previous which may reflect chronic inflammatory infectious process.        PROCEDURES   Unless otherwise noted below, none  Procedures     CRITICAL CARE TIME   0    EMERGENCY DEPARTMENT COURSE and DIFFERENTIAL DIAGNOSIS/MDM   Vitals:    Vitals:    03/12/23 2212 03/12/23 2236 03/12/23 2251 03/12/23 2321   BP:   (!) 147/92    Pulse: 85 94 (!) 106 93   Resp: 14 19     Temp:       SpO2: 98%      Weight:       Height:            Patient was given the following medications:  Medications   sodium chloride (NS) flush 5-10 mL (has no administration in time range)   albuterol-ipratropium (DUO-NEB) 2.5 MG-0.5 MG/3 ML (3 mL Nebulization Given 3/12/23 2209)   methylPREDNISolone (PF) (Solu-MEDROL) injection 125 mg (125 mg IntraVENous Given 3/12/23 2203)   iopamidoL (ISOVUE-370) 370 mg iodine /mL (76 %) injection 100 mL (100 mL IntraVENous Given 3/12/23 2258)   albuterol (PROVENTIL VENTOLIN) nebulizer solution 5 mg (5 mg Nebulization Given 3/13/23 0020)   oxymetazoline (AFRIN) 0.05 % nasal spray 2 Spray (2 Sprays Both Nostrils Given 3/13/23 0009)       CONSULTS: (Who and What was discussed)  None    Chronic Conditions: Migraines and childhood asthma    Social Determinants affecting Dx or Tx: None    Records Reviewed (source and summary of external notes): Prior medical records, Previous Radiology studies, Previous Laboratory studies, Previous EKGs, Nursing notes, EMS run sheet, and primary care and urgent care office notes  February 2022 was diagnosed with sinusitis and put on antibiotics  September 2022 was diagnosed with lower respiratory infection and put on doxycycline  October 19, 2022 there were no change in symptoms so he was started on Augmentin October 21, 2022 we came to the ER and was diagnosed with community-acquired pneumonia and started on levofloxacin. His primary care doctor ordered CT scan which showed right lower lobe pneumonia. 11/8/2022 he went back to his primary care doctor the cough was not improved and so a pulmonology referral was placed and he was prescribed Symbicort with prednisone. Symptoms returned February 2022 he was given a prednisone taper and albuterol at the local urgent care. March 8, 2022 he was again seen at urgent care, chest x-ray looked the same as all his previous with no change in the right lower lobe pneumonia and he was given albuterol, cough syrup and levofloxacin 500 mg tablets    CC/HPI Summary, DDx, ED Course, and Reassessment: Middle-age male with a history of asthma as a child, who is never been a smoker and has had multiple visits in the last 6 months for bronchitis and been given multiple rounds of antibiotics had multiple x-rays and none of the imaging has changed or improved. Currently in respiratory distress requiring further medications with albuterol. He was not given prednisone 3 days ago at the urgent cares we will give him Solu-Medrol here in the emergency room. Unable to apply Wells criteria as he is tachycardic and tachypneic so D-dimer to be obtained.     ED Course as of 03/13/23 0029   Guerrero Downing Mar 12, 2023   4728 Patient much improved after nebulizer treatment although now complaining of tingling in his left hand. Able to move good air but does have some wheezing at bilateral bases. Vital signs improved with a blood pressure decreased from previous. Heart rate currently 92 and respiratory rate 19 with oxygen saturation at 94%. [JT]      ED Course User Index  [JT] Yolis Hawthorne MD       Disposition Considerations (Tests not done, Shared Decision Making, Pt Expectation of Test or Tx.): Elisha Childress male with continuous bronchitis since the fall presented with worsening symptoms today requiring repeat neb treatments. Oxygen saturation is remained stable, his blood pressure is better and CT angio without evidence of acute PE.  CT also without evidence of new pneumonia but just shows inflammatory changes that he has had in the same area of his lungs for the past few months. This point I am not sure that further antibiotics are warranted as he has had multiple rounds that have not improved his inflammation in the right lower lung. I do recommend patient to take prednisone and we can try Spiriva with his symptoms but he will really need to see a pulmonologist.  Referral provided for him and we discussed management of his symptoms at home as well as strict return precautions. FINAL IMPRESSION     1. Acute bronchitis, unspecified organism          DISPOSITION/PLAN   Discharged    Discharge Note: The patient is stable for discharge home. The signs, symptoms, diagnosis, and discharge instructions have been discussed, understanding conveyed, and agreed upon. The patient is to follow up as recommended or return to ER should their symptoms worsen.       PATIENT REFERRED TO:  Follow-up Information       Follow up With Specialties Details Why Contact Info    Tiny Osman MD Pulmonary Disease Schedule an appointment as soon as possible for a visit   [address]  [address]  [address] [facility]      [address] [facility] Emergency Medicine  If symptoms worsen [address] [address]  [phone]              DISCHARGE MEDICATIONS:  Current Discharge Medication List        START taking these medications    Details   tiotropium bromide (Spiriva Respimat) 1.25 mcg/actuation inhaler Take 2 Puffs by inhalation daily. Qty: 4 g, Refills: 0  Start date: 3/12/2023      predniSONE (DELTASONE) 10 mg tablet Take 30 mg by mouth daily (with breakfast) for 5 days. Qty: 15 Tablet, Refills: 0  Start date: 3/13/2023, End date: 3/18/2023      cetirizine (ZyrTEC) 10 mg tablet Take 1 Tablet by mouth daily. Qty: 20 Tablet, Refills: 0  Start date: 3/12/2023      albuterol-ipratropium (DUO-NEB) 2.5 mg-0.5 mg/3 ml nebu 3 mL by Nebulization route every four (4) hours as needed for Shortness of Breath or Respiratory Distress. Qty: 30 Each, Refills: 0  Start date: 3/12/2023               DISCONTINUED MEDICATIONS:  Current Discharge Medication List          I am the Primary Clinician of Record. Frandy Cervantes. MD Christoph (electronically signed)    (Please note that parts of this dictation were completed with voice recognition software. Quite often unanticipated grammatical, syntax, homophones, and other interpretive errors are inadvertently transcribed by the computer software. Please disregards these errors.  Please excuse any errors that have escaped final proofreading.)

## 2023-03-17 LAB
ATRIAL RATE: 93 BPM
CALCULATED P AXIS, ECG09: 46 DEGREES
CALCULATED R AXIS, ECG10: 26 DEGREES
CALCULATED T AXIS, ECG11: 16 DEGREES
DIAGNOSIS, 93000: NORMAL
P-R INTERVAL, ECG05: 110 MS
Q-T INTERVAL, ECG07: 346 MS
QRS DURATION, ECG06: 78 MS
QTC CALCULATION (BEZET), ECG08: 437 MS
VENTRICULAR RATE, ECG03: 96 BPM

## 2023-09-22 ENCOUNTER — TELEPHONE (OUTPATIENT)
Age: 53
End: 2023-09-22

## 2023-09-22 NOTE — TELEPHONE ENCOUNTER
PT has poison ivy extremly bad and cannot get an appt. till Tuesday at our location and wanted to know if he can get an approval for this shot if he gets it at the [address] [place] [address],[address] here in [place]. Fax# [phone]

## 2024-02-01 ENCOUNTER — OFFICE VISIT (OUTPATIENT)
Dept: FAMILY MEDICINE CLINIC | Age: 54
End: 2024-02-01

## 2024-02-01 VITALS
WEIGHT: 190 LBS | BODY MASS INDEX: 26.6 KG/M2 | SYSTOLIC BLOOD PRESSURE: 176 MMHG | DIASTOLIC BLOOD PRESSURE: 105 MMHG | TEMPERATURE: 98.5 F | RESPIRATION RATE: 12 BRPM | OXYGEN SATURATION: 95 % | HEART RATE: 76 BPM | HEIGHT: 71 IN

## 2024-02-01 DIAGNOSIS — J18.9 PNEUMONIA DUE TO INFECTIOUS ORGANISM, UNSPECIFIED LATERALITY, UNSPECIFIED PART OF LUNG: Primary | ICD-10-CM

## 2024-02-01 DIAGNOSIS — J45.41 MODERATE PERSISTENT ASTHMA WITH EXACERBATION: ICD-10-CM

## 2024-02-01 DIAGNOSIS — R91.1 NODULE OF RIGHT LUNG: ICD-10-CM

## 2024-02-01 PROCEDURE — 99213 OFFICE O/P EST LOW 20 MIN: CPT | Performed by: FAMILY MEDICINE

## 2024-02-01 RX ORDER — AZITHROMYCIN 250 MG/1
250 TABLET, FILM COATED ORAL SEE ADMIN INSTRUCTIONS
Qty: 6 TABLET | Refills: 0 | Status: SHIPPED | OUTPATIENT
Start: 2024-02-01 | End: 2024-02-03 | Stop reason: SDUPTHER

## 2024-02-01 RX ORDER — FLUTICASONE FUROATE, UMECLIDINIUM BROMIDE AND VILANTEROL TRIFENATATE 200; 62.5; 25 UG/1; UG/1; UG/1
1 POWDER RESPIRATORY (INHALATION) DAILY
Qty: 10 EACH | Refills: 0 | Status: SHIPPED | OUTPATIENT
Start: 2024-02-01 | End: 2024-02-03 | Stop reason: SDUPTHER

## 2024-02-01 RX ORDER — METHYLPREDNISOLONE 4 MG/1
TABLET ORAL
Qty: 1 KIT | Refills: 0 | Status: SHIPPED | OUTPATIENT
Start: 2024-02-01 | End: 2024-02-03 | Stop reason: SDUPTHER

## 2024-02-01 RX ORDER — GUAIFENESIN AND DEXTROMETHORPHAN HYDROBROMIDE 600; 30 MG/1; MG/1
1 TABLET, EXTENDED RELEASE ORAL 2 TIMES DAILY
Qty: 28 TABLET | Refills: 0 | Status: SHIPPED | OUTPATIENT
Start: 2024-02-01 | End: 2024-02-03 | Stop reason: SDUPTHER

## 2024-02-01 RX ORDER — CEFTRIAXONE 1 G/1
1000 INJECTION, POWDER, FOR SOLUTION INTRAMUSCULAR; INTRAVENOUS ONCE
Status: COMPLETED | OUTPATIENT
Start: 2024-02-01 | End: 2024-02-01

## 2024-02-01 RX ADMIN — CEFTRIAXONE 1000 MG: 1 INJECTION, POWDER, FOR SOLUTION INTRAMUSCULAR; INTRAVENOUS at 18:02

## 2024-02-01 ASSESSMENT — PATIENT HEALTH QUESTIONNAIRE - PHQ9
SUM OF ALL RESPONSES TO PHQ QUESTIONS 1-9: 0
2. FEELING DOWN, DEPRESSED OR HOPELESS: 0
SUM OF ALL RESPONSES TO PHQ QUESTIONS 1-9: 0
SUM OF ALL RESPONSES TO PHQ9 QUESTIONS 1 & 2: 0
1. LITTLE INTEREST OR PLEASURE IN DOING THINGS: 0

## 2024-02-01 ASSESSMENT — ENCOUNTER SYMPTOMS
DIARRHEA: 1
WHEEZING: 1
SHORTNESS OF BREATH: 0
COUGH: 1
RHINORRHEA: 1
VOMITING: 1
NAUSEA: 1
SORE THROAT: 1

## 2024-02-01 NOTE — PROGRESS NOTES
\"Have you been to the ER, urgent care clinic since your last visit?  Hospitalized since your last visit?\"    NO    “Have you seen or consulted any other health care providers outside of [facility] since your last visit?”    NO

## 2024-02-01 NOTE — PROGRESS NOTES
Mark Ford is a 53 y.o. male who presents to the office today with the following:  Chief Complaint   Patient presents with    Congestion     Cough, lo fever, anorexia, insomnia, x 4 days       HPI  Started 4 d ago  Terrible cough, raspy  Facial pressure  No appetite  Fever up to 101    Did not have Covid shots and Flu shot  Had them checked yesterday and all negative    In the past inhaled landscaping spray  Had spots on lungs  Sees Pulmonologist    No Tob use    Using nebs at home every day  Taking Sudafed and Niquil    Review of Systems   Constitutional:  Positive for fever.   HENT:  Positive for congestion, rhinorrhea and sore throat.    Respiratory:  Positive for cough and wheezing. Negative for shortness of breath.    Cardiovascular:  Positive for chest pain.   Gastrointestinal:  Positive for diarrhea, nausea and vomiting.   Musculoskeletal:  Positive for myalgias.       See HPI.    Past Medical History:   Diagnosis Date    Asthma     Blurred vision     Head injuries 2012    Headache        Past Surgical History:   Procedure Laterality Date    COLONOSCOPY  2019    normal, repeat in 5 years       No Known Allergies    Current Outpatient Medications   Medication Sig Dispense Refill    azithromycin (ZITHROMAX) 250 MG tablet Take 1 tablet by mouth See Admin Instructions for 5 days 500mg on day 1 followed by 250mg on days 2 - 5 6 tablet 0    methylPREDNISolone (MEDROL, SILVIA,) 4 MG tablet Take by mouth. 1 kit 0    fluticasone-umeclidin-vilant (TRELEGY ELLIPTA) 200-62.5-25 MCG/ACT AEPB inhaler Inhale 1 puff into the lungs daily 10 each 0    Dextromethorphan-guaiFENesin (MUCINEX DM)  MG TB12 Take 1 tablet by mouth in the morning and at bedtime 28 tablet 0    albuterol sulfate HFA (PROVENTIL;VENTOLIN;PROAIR) 108 (90 Base) MCG/ACT inhaler Inhale 2 puffs into the lungs every 4 hours as needed      albuterol (PROVENTIL) (2.5 MG/3ML) 0.083% nebulizer solution Inhale 3 mLs into the lungs every 4 hours as needed

## 2024-02-02 ENCOUNTER — HOSPITAL ENCOUNTER (OUTPATIENT)
Facility: HOSPITAL | Age: 54
Discharge: HOME | End: 2024-02-02

## 2024-02-02 DIAGNOSIS — J18.9 PNEUMONIA DUE TO INFECTIOUS ORGANISM, UNSPECIFIED LATERALITY, UNSPECIFIED PART OF LUNG: ICD-10-CM

## 2024-02-02 PROCEDURE — 71046 X-RAY EXAM CHEST 2 VIEWS: CPT

## 2024-02-03 ENCOUNTER — TELEPHONE (OUTPATIENT)
Dept: FAMILY MEDICINE CLINIC | Age: 54
End: 2024-02-03

## 2024-02-03 DIAGNOSIS — J45.41 MODERATE PERSISTENT ASTHMA WITH EXACERBATION: ICD-10-CM

## 2024-02-03 DIAGNOSIS — J18.9 PNEUMONIA DUE TO INFECTIOUS ORGANISM, UNSPECIFIED LATERALITY, UNSPECIFIED PART OF LUNG: ICD-10-CM

## 2024-02-03 RX ORDER — METHYLPREDNISOLONE 4 MG/1
TABLET ORAL
Qty: 1 KIT | Refills: 0 | Status: SHIPPED | OUTPATIENT
Start: 2024-02-03 | End: 2024-02-09

## 2024-02-03 RX ORDER — AZITHROMYCIN 250 MG/1
250 TABLET, FILM COATED ORAL SEE ADMIN INSTRUCTIONS
Qty: 6 TABLET | Refills: 0 | Status: SHIPPED | OUTPATIENT
Start: 2024-02-03 | End: 2024-02-08

## 2024-02-03 RX ORDER — GUAIFENESIN AND DEXTROMETHORPHAN HYDROBROMIDE 600; 30 MG/1; MG/1
1 TABLET, EXTENDED RELEASE ORAL 2 TIMES DAILY
Qty: 28 TABLET | Refills: 0 | Status: SHIPPED | OUTPATIENT
Start: 2024-02-03

## 2024-02-03 RX ORDER — FLUTICASONE FUROATE, UMECLIDINIUM BROMIDE AND VILANTEROL TRIFENATATE 200; 62.5; 25 UG/1; UG/1; UG/1
1 POWDER RESPIRATORY (INHALATION) DAILY
Qty: 10 EACH | Refills: 0 | Status: SHIPPED | OUTPATIENT
Start: 2024-02-03

## 2025-01-23 ENCOUNTER — OFFICE VISIT (OUTPATIENT)
Age: 55
End: 2025-01-23

## 2025-01-23 VITALS
DIASTOLIC BLOOD PRESSURE: 90 MMHG | SYSTOLIC BLOOD PRESSURE: 156 MMHG | OXYGEN SATURATION: 98 % | WEIGHT: 195 LBS | BODY MASS INDEX: 27.2 KG/M2 | TEMPERATURE: 98.6 F | HEART RATE: 94 BPM

## 2025-01-23 DIAGNOSIS — G62.9 NEUROPATHY: ICD-10-CM

## 2025-01-23 DIAGNOSIS — J45.41 MODERATE PERSISTENT ASTHMA WITH EXACERBATION: Primary | ICD-10-CM

## 2025-01-23 RX ORDER — MONTELUKAST SODIUM 10 MG/1
10 TABLET ORAL DAILY
Qty: 30 TABLET | Refills: 3 | Status: SHIPPED | OUTPATIENT
Start: 2025-01-23 | End: 2025-01-23

## 2025-01-23 RX ORDER — MONTELUKAST SODIUM 10 MG/1
10 TABLET ORAL DAILY
Qty: 30 TABLET | Refills: 3 | Status: SHIPPED | OUTPATIENT
Start: 2025-01-23

## 2025-01-23 RX ORDER — DOXYCYCLINE HYCLATE 100 MG
100 TABLET ORAL 2 TIMES DAILY
Qty: 20 TABLET | Refills: 0 | Status: SHIPPED | OUTPATIENT
Start: 2025-01-23 | End: 2025-01-23

## 2025-01-23 RX ORDER — FLUTICASONE FUROATE, UMECLIDINIUM BROMIDE AND VILANTEROL TRIFENATATE 200; 62.5; 25 UG/1; UG/1; UG/1
1 POWDER RESPIRATORY (INHALATION) DAILY
Qty: 10 EACH | Refills: 0 | Status: SHIPPED | OUTPATIENT
Start: 2025-01-23 | End: 2025-01-23

## 2025-01-23 RX ORDER — DOXYCYCLINE HYCLATE 100 MG
100 TABLET ORAL 2 TIMES DAILY
Qty: 20 TABLET | Refills: 0 | Status: SHIPPED | OUTPATIENT
Start: 2025-01-23 | End: 2025-02-02

## 2025-01-23 RX ORDER — FLUTICASONE FUROATE, UMECLIDINIUM BROMIDE AND VILANTEROL TRIFENATATE 200; 62.5; 25 UG/1; UG/1; UG/1
1 POWDER RESPIRATORY (INHALATION) DAILY
Qty: 10 EACH | Refills: 0 | Status: SHIPPED | OUTPATIENT
Start: 2025-01-23

## 2025-01-23 SDOH — ECONOMIC STABILITY: FOOD INSECURITY: WITHIN THE PAST 12 MONTHS, YOU WORRIED THAT YOUR FOOD WOULD RUN OUT BEFORE YOU GOT MONEY TO BUY MORE.: NEVER TRUE

## 2025-01-23 ASSESSMENT — PATIENT HEALTH QUESTIONNAIRE - PHQ9
SUM OF ALL RESPONSES TO PHQ QUESTIONS 1-9: 2
2. FEELING DOWN, DEPRESSED OR HOPELESS: SEVERAL DAYS
SUM OF ALL RESPONSES TO PHQ QUESTIONS 1-9: 2
1. LITTLE INTEREST OR PLEASURE IN DOING THINGS: SEVERAL DAYS
SUM OF ALL RESPONSES TO PHQ QUESTIONS 1-9: 2
SUM OF ALL RESPONSES TO PHQ9 QUESTIONS 1 & 2: 2
SUM OF ALL RESPONSES TO PHQ QUESTIONS 1-9: 2

## 2025-01-23 ASSESSMENT — ENCOUNTER SYMPTOMS
COUGH: 1
EYE DISCHARGE: 0
ABDOMINAL DISTENTION: 0
ABDOMINAL PAIN: 0
WHEEZING: 1
COLOR CHANGE: 0
EYE ITCHING: 0
SHORTNESS OF BREATH: 1

## 2025-01-23 NOTE — PROGRESS NOTES
Chief Complaint   Patient presents with    Asthma    Follow-up       There were no vitals filed for this visit.\"Have you been to the ER, urgent care clinic since your last visit?  Hospitalized since your last visit?\"    YES - When: approximately 14 days ago.  Where and Why: Urgent Care - Asthma.    “Have you seen or consulted any other health care providers outside our system since your last visit?”    NO      “Have you had a colorectal cancer screening such as a colonoscopy/FIT/Cologuard?    NO    Date of last Colonoscopy: 10/22/2019  No cologuard on file  No FIT/FOBT on file   No flexible sigmoidoscopy on file

## 2025-01-23 NOTE — PROGRESS NOTES
Assessment/ Plan:       ASSESSMENT AND PLAN  1. Moderate persistent asthma with exacerbation  Check labs  Medications refilled  Lungs are coarse with rhonchi--will add doxy today  Vitals stable today. Ambulatory in the office. Speaking in full sentences  - fluticasone-umeclidin-vilant (TRELEGY ELLIPTA) 200-62.5-25 MCG/ACT AEPB inhaler; Inhale 1 puff into the lungs daily  Dispense: 10 each; Refill: 0  - CBC with Auto Differential; Future  - Comprehensive Metabolic Panel; Future  - doxycycline hyclate (VIBRA-TABS) 100 MG tablet; Take 1 tablet by mouth 2 times daily for 10 days  Dispense: 20 tablet; Refill: 0  - [facility] - Myron Queen MD, Pulmonology, [place]  - Comprehensive Metabolic Panel  - CBC with Auto Differential    Chest xray from 1/25/2025 reviewed-->Small region of chronic reticular scarring of the right lung base.  No focal airspace opacity, pleural effusion, or pneumothorax.  No acute osseous findings.     Follow up one month--please follow up sooner if sxs do not improve because we will need to repeat the chest kelvin    2. Neuropathy  Check labs  - Vitamin B12; Future  - TSH; Future  - Folate; Future        Return in about 1 month (around 2/23/2025) for re-evaluate and discuss neuropathy again.   Check labs today.  Referral placed to pulmonology.  Follow-up in 1 month.    Subjective:  Mark Ford is a 54 y.o. male here for the following  Chief Complaint   Patient presents with    Asthma     Wants referral to pulm    Follow-up     Neuropathy   I have not seen him since 11/10/2022  Seen in urgent care a few days ago.  Was having an acute asthma exacerbation.  Had a chest x-ray completed. No focal airspace opacity, pleural effusion, or pneumothorax.  No acute osseous findings.  He was given a course of prednisone and albuterol inhalers.  He has previously had a pulmonologist but due to cost has not followed up.  He is requesting today to see the pulmonologist again.  He denies fevers but tells me

## 2025-01-24 ENCOUNTER — CLINICAL DOCUMENTATION (OUTPATIENT)
Age: 55
End: 2025-01-24

## 2025-01-24 DIAGNOSIS — J45.41 MODERATE PERSISTENT ASTHMA WITH EXACERBATION: Primary | ICD-10-CM

## 2025-01-24 LAB
ALBUMIN SERPL-MCNC: 3.5 G/DL (ref 3.5–5)
ALBUMIN/GLOB SERPL: 0.9 (ref 1.1–2.2)
ALP SERPL-CCNC: 92 U/L (ref 45–117)
ALT SERPL-CCNC: 22 U/L (ref 12–78)
ANION GAP SERPL CALC-SCNC: 5 MMOL/L (ref 2–12)
AST SERPL-CCNC: 13 U/L (ref 15–37)
BASOPHILS # BLD: 0.09 K/UL (ref 0–0.1)
BASOPHILS NFR BLD: 0.6 % (ref 0–1)
BILIRUB SERPL-MCNC: 0.4 MG/DL (ref 0.2–1)
BUN SERPL-MCNC: 17 MG/DL (ref 6–20)
BUN/CREAT SERPL: 20 (ref 12–20)
CALCIUM SERPL-MCNC: 9.6 MG/DL (ref 8.5–10.1)
CHLORIDE SERPL-SCNC: 106 MMOL/L (ref 97–108)
CO2 SERPL-SCNC: 28 MMOL/L (ref 21–32)
CREAT SERPL-MCNC: 0.87 MG/DL (ref 0.7–1.3)
DIFFERENTIAL METHOD BLD: ABNORMAL
EOSINOPHIL # BLD: 0.89 K/UL (ref 0–0.4)
EOSINOPHIL NFR BLD: 6.4 % (ref 0–7)
ERYTHROCYTE [DISTWIDTH] IN BLOOD BY AUTOMATED COUNT: 13.4 % (ref 11.5–14.5)
FOLATE SERPL-MCNC: 10 NG/ML (ref 5–21)
GLOBULIN SER CALC-MCNC: 3.9 G/DL (ref 2–4)
GLUCOSE SERPL-MCNC: 94 MG/DL (ref 65–100)
HCT VFR BLD AUTO: 42.9 % (ref 36.6–50.3)
HGB BLD-MCNC: 13.9 G/DL (ref 12.1–17)
IMM GRANULOCYTES # BLD AUTO: 0.12 K/UL (ref 0–0.04)
IMM GRANULOCYTES NFR BLD AUTO: 0.9 % (ref 0–0.5)
LYMPHOCYTES # BLD: 3.95 K/UL (ref 0.8–3.5)
LYMPHOCYTES NFR BLD: 28.3 % (ref 12–49)
MCH RBC QN AUTO: 28 PG (ref 26–34)
MCHC RBC AUTO-ENTMCNC: 32.4 G/DL (ref 30–36.5)
MCV RBC AUTO: 86.5 FL (ref 80–99)
MONOCYTES # BLD: 1.34 K/UL (ref 0–1)
MONOCYTES NFR BLD: 9.6 % (ref 5–13)
NEUTS SEG # BLD: 7.59 K/UL (ref 1.8–8)
NEUTS SEG NFR BLD: 54.2 % (ref 32–75)
NRBC # BLD: 0 K/UL (ref 0–0.01)
NRBC BLD-RTO: 0 PER 100 WBC
PLATELET # BLD AUTO: 366 K/UL (ref 150–400)
PMV BLD AUTO: 9.6 FL (ref 8.9–12.9)
POTASSIUM SERPL-SCNC: 4.3 MMOL/L (ref 3.5–5.1)
PROT SERPL-MCNC: 7.4 G/DL (ref 6.4–8.2)
RBC # BLD AUTO: 4.96 M/UL (ref 4.1–5.7)
SODIUM SERPL-SCNC: 139 MMOL/L (ref 136–145)
TSH SERPL DL<=0.05 MIU/L-ACNC: 2.13 UIU/ML (ref 0.36–3.74)
VIT B12 SERPL-MCNC: >2000 PG/ML (ref 193–986)
WBC # BLD AUTO: 14 K/UL (ref 4.1–11.1)

## 2025-01-24 RX ORDER — PREDNISONE 10 MG/1
TABLET ORAL
Qty: 21 TABLET | Refills: 0 | Status: SHIPPED | OUTPATIENT
Start: 2025-01-24

## 2025-01-24 RX ORDER — FLUTICASONE PROPIONATE AND SALMETEROL 250; 50 UG/1; UG/1
1 POWDER RESPIRATORY (INHALATION) EVERY 12 HOURS
Qty: 60 EACH | Refills: 3 | Status: SHIPPED | OUTPATIENT
Start: 2025-01-24

## 2025-02-03 ENCOUNTER — TELEPHONE (OUTPATIENT)
Age: 55
End: 2025-02-03

## 2025-02-03 NOTE — TELEPHONE ENCOUNTER
PT came in today wife his wife asking what he should do about his feet being swollen at night. It usually happens around 3 and says he feels pins and needles up to his knees sometimes. Wife said she does use salt in his diet. They are just not sure what is going on. Was previously talked about on [employer]. PT has a f/u appt on 2/24.

## 2025-02-04 DIAGNOSIS — G62.9 NEUROPATHY: Primary | ICD-10-CM

## 2025-02-04 NOTE — TELEPHONE ENCOUNTER
PC SHANNON Mrs. Ford, informed of advice message, stated will take the neurology Referral and thanks.

## 2025-02-13 ENCOUNTER — TELEPHONE (OUTPATIENT)
Age: 55
End: 2025-02-13

## 2025-02-13 NOTE — TELEPHONE ENCOUNTER
Referral received from PCP to be seen for Neuropathy. I left a message to call back if he'd like to schedule and appointment.

## 2025-02-21 RX ORDER — ALBUTEROL SULFATE 90 UG/1
2 INHALANT RESPIRATORY (INHALATION) EVERY 4 HOURS PRN
Qty: 18 G | Refills: 0 | Status: SHIPPED | OUTPATIENT
Start: 2025-02-21

## 2025-02-24 ENCOUNTER — OFFICE VISIT (OUTPATIENT)
Age: 55
End: 2025-02-24

## 2025-02-24 VITALS
WEIGHT: 195.38 LBS | DIASTOLIC BLOOD PRESSURE: 90 MMHG | HEART RATE: 88 BPM | RESPIRATION RATE: 18 BRPM | SYSTOLIC BLOOD PRESSURE: 171 MMHG | BODY MASS INDEX: 27.35 KG/M2 | OXYGEN SATURATION: 95 % | HEIGHT: 71 IN | TEMPERATURE: 98 F

## 2025-02-24 DIAGNOSIS — R60.0 LOWER EXTREMITY EDEMA: ICD-10-CM

## 2025-02-24 DIAGNOSIS — G62.9 NEUROPATHY: ICD-10-CM

## 2025-02-24 DIAGNOSIS — D72.829 LEUKOCYTOSIS, UNSPECIFIED TYPE: ICD-10-CM

## 2025-02-24 DIAGNOSIS — I10 HYPERTENSION, UNSPECIFIED TYPE: ICD-10-CM

## 2025-02-24 DIAGNOSIS — R06.02 SHORTNESS OF BREATH: ICD-10-CM

## 2025-02-24 DIAGNOSIS — J45.41 MODERATE PERSISTENT ASTHMA WITH EXACERBATION: Primary | ICD-10-CM

## 2025-02-24 PROCEDURE — 99214 OFFICE O/P EST MOD 30 MIN: CPT | Performed by: NURSE PRACTITIONER

## 2025-02-24 RX ORDER — ACETAMINOPHEN 500 MG
1000 TABLET ORAL DAILY PRN
Qty: 14 TABLET | Refills: 0 | Status: SHIPPED | OUTPATIENT
Start: 2025-02-24

## 2025-02-24 RX ORDER — HYDROCHLOROTHIAZIDE 25 MG/1
25 TABLET ORAL EVERY MORNING
Qty: 90 TABLET | Refills: 1 | Status: SHIPPED | OUTPATIENT
Start: 2025-02-24

## 2025-02-24 RX ORDER — AMITRIPTYLINE HYDROCHLORIDE 10 MG/1
10 TABLET ORAL NIGHTLY
Qty: 90 TABLET | Refills: 0 | Status: SHIPPED | OUTPATIENT
Start: 2025-02-24

## 2025-02-24 SDOH — ECONOMIC STABILITY: FOOD INSECURITY: WITHIN THE PAST 12 MONTHS, YOU WORRIED THAT YOUR FOOD WOULD RUN OUT BEFORE YOU GOT MONEY TO BUY MORE.: NEVER TRUE

## 2025-02-24 SDOH — ECONOMIC STABILITY: FOOD INSECURITY: WITHIN THE PAST 12 MONTHS, THE FOOD YOU BOUGHT JUST DIDN'T LAST AND YOU DIDN'T HAVE MONEY TO GET MORE.: NEVER TRUE

## 2025-02-24 ASSESSMENT — PATIENT HEALTH QUESTIONNAIRE - PHQ9
SUM OF ALL RESPONSES TO PHQ9 QUESTIONS 1 & 2: 0
2. FEELING DOWN, DEPRESSED OR HOPELESS: NOT AT ALL
1. LITTLE INTEREST OR PLEASURE IN DOING THINGS: NOT AT ALL
SUM OF ALL RESPONSES TO PHQ QUESTIONS 1-9: 0

## 2025-02-24 ASSESSMENT — ENCOUNTER SYMPTOMS
WHEEZING: 0
ABDOMINAL DISTENTION: 0
EYE ITCHING: 0
EYE DISCHARGE: 0
ABDOMINAL PAIN: 0
SHORTNESS OF BREATH: 0
COUGH: 0
COLOR CHANGE: 0

## 2025-02-24 NOTE — PROGRESS NOTES
\"Have you been to the ER, urgent care clinic since your last visit?  Hospitalized since your last visit?\"    NO    “Have you seen or consulted any other health care providers outside our system since your last visit?”    NO      “Have you had a colorectal cancer screening such as a colonoscopy/FIT/Cologuard?    NO needs apt    Date of last Colonoscopy: 10/22/2019  No cologuard on file  No FIT/FOBT on file   No flexible sigmoidoscopy on file

## 2025-02-24 NOTE — PROGRESS NOTES
Assessment/ Plan:       ASSESSMENT AND PLAN  1. Moderate persistent asthma  Continue advair daily  Continue albuterol PRN  Lungs clear  Mild wheeze to RUL  Leukocytosis on previous labs from illness/steroid use  Recheck CBC today      2. Neuropathy  Vitamin B12 elevated  TSH normal  Folate normal  Will try tylenol and amitriptyline    3. HTN  Most likely related to NSAID overuse. Has been taking 2 aleve and 2 ibuprofen daily to control lower extremity neuropathy pain  Mild swelling to bilateral lower extremities. +bilateral DP pulses  Start hctz 25 mg daily--kidney function has been normal.  Recheck CMP  Avoid NSAID use and start amitriptyline. Will provide short supply of tylenol for pain PRN    Recheck BNP since he is concerned for heart failure. Last BNP check was normal      Return in about 3 weeks (around 3/17/2025) for blood pressure check.   Check labs today  Start medications  Stop NSAIDS  Follow up 3-4 weeks    Subjective:  Mark Ford is a 54 y.o. male here for the following  Chief Complaint   Patient presents with    Follow-up     Asthma  B/P running high  Still having foot and ankle swelling with 5 pound weight gain     Previously seen for his asthma. Doing much better today. \"Breathing is much better.\" \"More active with work again.\"  Using the advair daily. Has not needed the albuterol.     \"My only problem is the swelling in my legs and my blood pressure being high.\"    He did note some lower extremity edema on his recent visit with urgent care.  BNP was checked which was within normal limits.  He reported taking ibuprofen 4 tablets couple times a day.  He is still taking the same does of ibuprofen. He takes this for his \"neuropathy.\" I did refer him to neurology to discuss his neuropathy which he plans to do.  His TSH and folate were normal. B12 has been elevated. He was previously drinking energy drinks which he has stopped drinking. Trying to eat better.    Works as a . He is on

## 2025-02-25 ENCOUNTER — TELEPHONE (OUTPATIENT)
Age: 55
End: 2025-02-25

## 2025-02-25 DIAGNOSIS — G62.9 NEUROPATHY: Primary | ICD-10-CM

## 2025-02-25 LAB
ALBUMIN SERPL-MCNC: 3.7 G/DL (ref 3.5–5)
ALBUMIN/GLOB SERPL: 1 (ref 1.1–2.2)
ALP SERPL-CCNC: 95 U/L (ref 45–117)
ALT SERPL-CCNC: 64 U/L (ref 12–78)
ANION GAP SERPL CALC-SCNC: 6 MMOL/L (ref 2–12)
AST SERPL-CCNC: 53 U/L (ref 15–37)
BASOPHILS # BLD: 0.12 K/UL (ref 0–0.1)
BASOPHILS NFR BLD: 1.5 % (ref 0–1)
BILIRUB SERPL-MCNC: 0.4 MG/DL (ref 0.2–1)
BUN SERPL-MCNC: 11 MG/DL (ref 6–20)
BUN/CREAT SERPL: 12 (ref 12–20)
CALCIUM SERPL-MCNC: 9.9 MG/DL (ref 8.5–10.1)
CHLORIDE SERPL-SCNC: 110 MMOL/L (ref 97–108)
CO2 SERPL-SCNC: 25 MMOL/L (ref 21–32)
CREAT SERPL-MCNC: 0.93 MG/DL (ref 0.7–1.3)
DIFFERENTIAL METHOD BLD: ABNORMAL
EOSINOPHIL # BLD: 0.79 K/UL (ref 0–0.4)
EOSINOPHIL NFR BLD: 9.8 % (ref 0–7)
ERYTHROCYTE [DISTWIDTH] IN BLOOD BY AUTOMATED COUNT: 13.3 % (ref 11.5–14.5)
GLOBULIN SER CALC-MCNC: 3.8 G/DL (ref 2–4)
GLUCOSE SERPL-MCNC: 118 MG/DL (ref 65–100)
HCT VFR BLD AUTO: 43.8 % (ref 36.6–50.3)
HGB BLD-MCNC: 14 G/DL (ref 12.1–17)
IMM GRANULOCYTES # BLD AUTO: 0.02 K/UL (ref 0–0.04)
IMM GRANULOCYTES NFR BLD AUTO: 0.2 % (ref 0–0.5)
LYMPHOCYTES # BLD: 1.72 K/UL (ref 0.8–3.5)
LYMPHOCYTES NFR BLD: 21.4 % (ref 12–49)
MCH RBC QN AUTO: 27.4 PG (ref 26–34)
MCHC RBC AUTO-ENTMCNC: 32 G/DL (ref 30–36.5)
MCV RBC AUTO: 85.7 FL (ref 80–99)
MONOCYTES # BLD: 1.04 K/UL (ref 0–1)
MONOCYTES NFR BLD: 12.9 % (ref 5–13)
NEUTS SEG # BLD: 4.35 K/UL (ref 1.8–8)
NEUTS SEG NFR BLD: 54.2 % (ref 32–75)
NRBC # BLD: 0 K/UL (ref 0–0.01)
NRBC BLD-RTO: 0 PER 100 WBC
NT PRO BNP: 131 PG/ML
PLATELET # BLD AUTO: 358 K/UL (ref 150–400)
POTASSIUM SERPL-SCNC: 4.2 MMOL/L (ref 3.5–5.1)
PROT SERPL-MCNC: 7.5 G/DL (ref 6.4–8.2)
RBC # BLD AUTO: 5.11 M/UL (ref 4.1–5.7)
SODIUM SERPL-SCNC: 141 MMOL/L (ref 136–145)
WBC # BLD AUTO: 8 K/UL (ref 4.1–11.1)

## 2025-02-25 RX ORDER — PREGABALIN 25 MG/1
25 CAPSULE ORAL 2 TIMES DAILY
Qty: 60 CAPSULE | Refills: 0 | Status: SHIPPED | OUTPATIENT
Start: 2025-02-25 | End: 2025-02-26

## 2025-02-25 NOTE — TELEPHONE ENCOUNTER
Pt called wanting to know if there is anything else he can take for pain because the tylenol is not touching the pain in his feet

## 2025-02-26 ENCOUNTER — TELEPHONE (OUTPATIENT)
Age: 55
End: 2025-02-26

## 2025-02-26 DIAGNOSIS — G62.9 NEUROPATHY: ICD-10-CM

## 2025-02-26 RX ORDER — PREGABALIN 25 MG/1
50 CAPSULE ORAL 2 TIMES DAILY
Qty: 120 CAPSULE | Refills: 0
Start: 2025-02-26 | End: 2025-03-28

## 2025-02-26 NOTE — TELEPHONE ENCOUNTER
PT's wife called and stated that Mark is in a lot of pain and now has a red rash on his right foot. Was prescribed Lyrica yesterday and said it does not work. Pain level of 11. LOV 2/24. Would like a call back.     [phone] (Home Phone)  [phone] (Mobile)

## 2025-03-11 DIAGNOSIS — G62.9 NEUROPATHY: ICD-10-CM

## 2025-03-11 RX ORDER — PREGABALIN 25 MG/1
25 CAPSULE ORAL 2 TIMES DAILY
Qty: 60 CAPSULE | Refills: 0 | Status: SHIPPED | OUTPATIENT
Start: 2025-03-11 | End: 2025-04-10

## 2025-03-11 RX ORDER — ALBUTEROL SULFATE 0.83 MG/ML
2.5 SOLUTION RESPIRATORY (INHALATION) 4 TIMES DAILY PRN
Qty: 120 EACH | Refills: 3 | Status: SHIPPED | OUTPATIENT
Start: 2025-03-11

## 2025-03-11 NOTE — TELEPHONE ENCOUNTER
Patient requesting refill on     Requested Prescriptions     Pending Prescriptions Disp Refills    pregabalin (LYRICA) 25 MG capsule [Pharmacy Med Name: Pregabalin 25 MG Oral Capsule] 60 capsule 0     Sig: Take 1 capsule by mouth 2 times daily.        Last OV 2/24/2025

## 2025-03-11 NOTE — TELEPHONE ENCOUNTER
PT requesting refill on:    albuterol (PROVENTIL) (2.5 MG/3ML) 0.083% nebulizer solution [[id number]]     NOV 3/17    [address] - [address] [place] [address] -  [phone] - F [phone] [[id number]]

## 2025-03-12 RX ORDER — ALBUTEROL SULFATE 90 UG/1
2 INHALANT RESPIRATORY (INHALATION) EVERY 4 HOURS PRN
Qty: 18 G | Refills: 0 | Status: SHIPPED | OUTPATIENT
Start: 2025-03-12

## 2025-03-12 RX ORDER — PREGABALIN 25 MG/1
25 CAPSULE ORAL 2 TIMES DAILY
Qty: 60 CAPSULE | Refills: 0 | OUTPATIENT
Start: 2025-03-12 | End: 2025-04-11

## 2025-03-14 ENCOUNTER — APPOINTMENT (OUTPATIENT)
Facility: HOSPITAL | Age: 55
End: 2025-03-14

## 2025-03-14 ENCOUNTER — HOSPITAL ENCOUNTER (EMERGENCY)
Facility: HOSPITAL | Age: 55
Discharge: HOME | End: 2025-03-15
Attending: EMERGENCY MEDICINE

## 2025-03-14 DIAGNOSIS — J45.901 MILD ASTHMA WITH EXACERBATION, UNSPECIFIED WHETHER PERSISTENT: ICD-10-CM

## 2025-03-14 DIAGNOSIS — J18.9 PNEUMONIA OF LEFT LOWER LOBE DUE TO INFECTIOUS ORGANISM: Primary | ICD-10-CM

## 2025-03-14 LAB
ALBUMIN SERPL-MCNC: 3.3 G/DL (ref 3.5–5)
ALBUMIN/GLOB SERPL: 0.8 (ref 1.1–2.2)
ALP SERPL-CCNC: 105 U/L (ref 45–117)
ALT SERPL-CCNC: 36 U/L (ref 12–78)
ANION GAP SERPL CALC-SCNC: 10 MMOL/L (ref 2–12)
AST SERPL-CCNC: 26 U/L (ref 15–37)
BASOPHILS # BLD: 0.26 K/UL (ref 0–0.1)
BASOPHILS NFR BLD: 2 % (ref 0–1)
BILIRUB SERPL-MCNC: 0.5 MG/DL (ref 0.2–1)
BUN SERPL-MCNC: 8 MG/DL (ref 6–20)
BUN/CREAT SERPL: 10 (ref 12–20)
CALCIUM SERPL-MCNC: 9.2 MG/DL (ref 8.5–10.1)
CHLORIDE SERPL-SCNC: 105 MMOL/L (ref 97–108)
CO2 SERPL-SCNC: 28 MMOL/L (ref 21–32)
CREAT SERPL-MCNC: 0.78 MG/DL (ref 0.7–1.3)
DIFFERENTIAL METHOD BLD: ABNORMAL
EKG ATRIAL RATE: 102 BPM
EKG DIAGNOSIS: NORMAL
EKG P AXIS: 40 DEGREES
EKG P-R INTERVAL: 120 MS
EKG Q-T INTERVAL: 364 MS
EKG QRS DURATION: 94 MS
EKG QTC CALCULATION (BAZETT): 474 MS
EKG R AXIS: 7 DEGREES
EKG T AXIS: 5 DEGREES
EKG VENTRICULAR RATE: 102 BPM
EOSINOPHIL # BLD: 1.06 K/UL (ref 0–0.4)
EOSINOPHIL NFR BLD: 8 % (ref 0–7)
ERYTHROCYTE [DISTWIDTH] IN BLOOD BY AUTOMATED COUNT: 13.2 % (ref 11.5–14.5)
FLUAV RNA SPEC QL NAA+PROBE: NOT DETECTED
FLUBV RNA SPEC QL NAA+PROBE: NOT DETECTED
GLOBULIN SER CALC-MCNC: 4 G/DL (ref 2–4)
GLUCOSE SERPL-MCNC: 96 MG/DL (ref 65–100)
HCT VFR BLD AUTO: 41.7 % (ref 36.6–50.3)
HGB BLD-MCNC: 13.5 G/DL (ref 12.1–17)
IMM GRANULOCYTES # BLD AUTO: 0 K/UL (ref 0–0.04)
IMM GRANULOCYTES NFR BLD AUTO: 0 % (ref 0–0.5)
LYMPHOCYTES # BLD: 2.77 K/UL (ref 0.8–3.5)
LYMPHOCYTES NFR BLD: 21 % (ref 12–49)
MCH RBC QN AUTO: 27.3 PG (ref 26–34)
MCHC RBC AUTO-ENTMCNC: 32.4 G/DL (ref 30–36.5)
MCV RBC AUTO: 84.4 FL (ref 80–99)
MONOCYTES # BLD: 0.92 K/UL (ref 0–1)
MONOCYTES NFR BLD: 7 % (ref 5–13)
NEUTS SEG # BLD: 8.19 K/UL (ref 1.8–8)
NEUTS SEG NFR BLD: 62 % (ref 32–75)
NRBC # BLD: 0 K/UL (ref 0–0.01)
NRBC BLD-RTO: 0 PER 100 WBC
NT PRO BNP: 254 PG/ML (ref 0–125)
PLATELET # BLD AUTO: 392 K/UL (ref 150–400)
PLATELET COMMENT: ADEQUATE
PMV BLD AUTO: 8.8 FL (ref 8.9–12.9)
POTASSIUM SERPL-SCNC: 3.3 MMOL/L (ref 3.5–5.1)
PROT SERPL-MCNC: 7.3 G/DL (ref 6.4–8.2)
RBC # BLD AUTO: 4.94 M/UL (ref 4.1–5.7)
RBC MORPH BLD: ABNORMAL
SARS-COV-2 RNA RESP QL NAA+PROBE: NOT DETECTED
SODIUM SERPL-SCNC: 143 MMOL/L (ref 136–145)
SOURCE: NORMAL
TROPONIN I SERPL HS-MCNC: 6 NG/L (ref 0–76)
WBC # BLD AUTO: 13.2 K/UL (ref 4.1–11.1)

## 2025-03-14 PROCEDURE — 36415 COLL VENOUS BLD VENIPUNCTURE: CPT

## 2025-03-14 PROCEDURE — 83880 ASSAY OF NATRIURETIC PEPTIDE: CPT

## 2025-03-14 PROCEDURE — 6370000000 HC RX 637 (ALT 250 FOR IP): Performed by: EMERGENCY MEDICINE

## 2025-03-14 PROCEDURE — 99285 EMERGENCY DEPT VISIT HI MDM: CPT

## 2025-03-14 PROCEDURE — 87636 SARSCOV2 & INF A&B AMP PRB: CPT

## 2025-03-14 PROCEDURE — 85025 COMPLETE CBC W/AUTO DIFF WBC: CPT

## 2025-03-14 PROCEDURE — 80053 COMPREHEN METABOLIC PANEL: CPT

## 2025-03-14 PROCEDURE — 85379 FIBRIN DEGRADATION QUANT: CPT

## 2025-03-14 PROCEDURE — 84484 ASSAY OF TROPONIN QUANT: CPT

## 2025-03-14 PROCEDURE — 93005 ELECTROCARDIOGRAM TRACING: CPT | Performed by: EMERGENCY MEDICINE

## 2025-03-14 PROCEDURE — 96374 THER/PROPH/DIAG INJ IV PUSH: CPT

## 2025-03-14 PROCEDURE — 6360000002 HC RX W HCPCS: Performed by: EMERGENCY MEDICINE

## 2025-03-14 PROCEDURE — 2500000003 HC RX 250 WO HCPCS

## 2025-03-14 PROCEDURE — 71045 X-RAY EXAM CHEST 1 VIEW: CPT

## 2025-03-14 PROCEDURE — 94640 AIRWAY INHALATION TREATMENT: CPT

## 2025-03-14 RX ORDER — AZITHROMYCIN 250 MG/1
500 TABLET, FILM COATED ORAL
Status: COMPLETED | OUTPATIENT
Start: 2025-03-14 | End: 2025-03-14

## 2025-03-14 RX ORDER — METHYLPREDNISOLONE SODIUM SUCCINATE 125 MG/2ML
80 INJECTION INTRAMUSCULAR; INTRAVENOUS
Status: COMPLETED | OUTPATIENT
Start: 2025-03-14 | End: 2025-03-14

## 2025-03-14 RX ORDER — IPRATROPIUM BROMIDE AND ALBUTEROL SULFATE 2.5; .5 MG/3ML; MG/3ML
1 SOLUTION RESPIRATORY (INHALATION)
Status: COMPLETED | OUTPATIENT
Start: 2025-03-14 | End: 2025-03-14

## 2025-03-14 RX ORDER — WATER 10 ML/10ML
INJECTION INTRAMUSCULAR; INTRAVENOUS; SUBCUTANEOUS
Status: COMPLETED
Start: 2025-03-14 | End: 2025-03-14

## 2025-03-14 RX ORDER — POTASSIUM CHLORIDE 750 MG/1
20 TABLET, EXTENDED RELEASE ORAL ONCE
Status: COMPLETED | OUTPATIENT
Start: 2025-03-14 | End: 2025-03-14

## 2025-03-14 RX ORDER — AZITHROMYCIN 250 MG/1
TABLET, FILM COATED ORAL
Status: DISCONTINUED
Start: 2025-03-14 | End: 2025-03-15 | Stop reason: HOSPADM

## 2025-03-14 RX ADMIN — IPRATROPIUM BROMIDE AND ALBUTEROL SULFATE 1 DOSE: .5; 2.5 SOLUTION RESPIRATORY (INHALATION) at 21:44

## 2025-03-14 RX ADMIN — METHYLPREDNISOLONE SODIUM SUCCINATE 80 MG: 125 INJECTION INTRAMUSCULAR; INTRAVENOUS at 21:42

## 2025-03-14 RX ADMIN — WATER 10 ML: 1 INJECTION INTRAMUSCULAR; INTRAVENOUS; SUBCUTANEOUS at 21:43

## 2025-03-14 RX ADMIN — POTASSIUM CHLORIDE 20 MEQ: 750 TABLET, EXTENDED RELEASE ORAL at 22:43

## 2025-03-14 RX ADMIN — IPRATROPIUM BROMIDE AND ALBUTEROL SULFATE 1 DOSE: .5; 2.5 SOLUTION RESPIRATORY (INHALATION) at 22:57

## 2025-03-14 RX ADMIN — AZITHROMYCIN DIHYDRATE 500 MG: 250 TABLET ORAL at 22:44

## 2025-03-14 ASSESSMENT — ENCOUNTER SYMPTOMS
ABDOMINAL PAIN: 0
SHORTNESS OF BREATH: 1
VOMITING: 0
COUGH: 1
SORE THROAT: 0
EYE REDNESS: 0
NAUSEA: 0
EYE DISCHARGE: 0

## 2025-03-14 ASSESSMENT — PAIN - FUNCTIONAL ASSESSMENT
PAIN_FUNCTIONAL_ASSESSMENT: 0-10
PAIN_FUNCTIONAL_ASSESSMENT: NONE - DENIES PAIN

## 2025-03-14 ASSESSMENT — PAIN SCALES - GENERAL: PAINLEVEL_OUTOF10: 0

## 2025-03-15 ENCOUNTER — APPOINTMENT (OUTPATIENT)
Facility: HOSPITAL | Age: 55
End: 2025-03-15

## 2025-03-15 VITALS
RESPIRATION RATE: 27 BRPM | DIASTOLIC BLOOD PRESSURE: 83 MMHG | OXYGEN SATURATION: 92 % | BODY MASS INDEX: 24.69 KG/M2 | HEART RATE: 94 BPM | SYSTOLIC BLOOD PRESSURE: 125 MMHG | WEIGHT: 177 LBS | TEMPERATURE: 99.6 F

## 2025-03-15 LAB — D DIMER PPP FEU-MCNC: 0.66 MG/L FEU (ref 0–0.65)

## 2025-03-15 PROCEDURE — 6360000004 HC RX CONTRAST MEDICATION: Performed by: EMERGENCY MEDICINE

## 2025-03-15 PROCEDURE — 71275 CT ANGIOGRAPHY CHEST: CPT

## 2025-03-15 PROCEDURE — 6370000000 HC RX 637 (ALT 250 FOR IP): Performed by: EMERGENCY MEDICINE

## 2025-03-15 RX ORDER — AZITHROMYCIN 250 MG/1
250 TABLET, FILM COATED ORAL DAILY
Qty: 4 TABLET | Refills: 0 | Status: SHIPPED | OUTPATIENT
Start: 2025-03-15 | End: 2025-03-19

## 2025-03-15 RX ORDER — IPRATROPIUM BROMIDE AND ALBUTEROL SULFATE 2.5; .5 MG/3ML; MG/3ML
1 SOLUTION RESPIRATORY (INHALATION) EVERY 6 HOURS PRN
Qty: 120 ML | Refills: 0 | Status: SHIPPED | OUTPATIENT
Start: 2025-03-15

## 2025-03-15 RX ORDER — IOPAMIDOL 755 MG/ML
100 INJECTION, SOLUTION INTRAVASCULAR
Status: COMPLETED | OUTPATIENT
Start: 2025-03-15 | End: 2025-03-15

## 2025-03-15 RX ORDER — PREDNISONE 20 MG/1
60 TABLET ORAL DAILY
Qty: 15 TABLET | Refills: 0 | Status: SHIPPED | OUTPATIENT
Start: 2025-03-15 | End: 2025-03-20

## 2025-03-15 RX ADMIN — IOPAMIDOL 100 ML: 755 INJECTION, SOLUTION INTRAVENOUS at 01:20

## 2025-03-15 RX ADMIN — AMOXICILLIN AND CLAVULANATE POTASSIUM 1 TABLET: 875; 125 TABLET, FILM COATED ORAL at 01:45

## 2025-03-15 NOTE — ED PROVIDER NOTES
regular . Rate (approx.): 102; Blocks: none; Ectopy: none Axis: normal; P wave: normal; QRS interval: normal ; ST/T wave: normal; in  Lead: ; Other findings: .  As Interpreted by me  Waldemar Martinez MD     RADIOLOGY:  Non-plain film images such as CT, Ultrasound and MRI are read by the radiologist. Plain radiographic images are visualized and preliminarily interpreted by the ED Provider with the below findings:          Interpretation per the Radiologist below, if available at the time of this note:     CTA CHEST W WO CONTRAST PE Eval   Final Result   Patchy left lower lobe airspace opacities suspicious for pneumonia   in the proper clinical setting. No pulmonary embolus or other acute   cardiopulmonary process.         Electronically signed by Chico Lin      XR CHEST PORTABLE   Final Result   No acute cardiopulmonary findings.            Electronically signed by CYRUS TOBIN               PROCEDURES   Unless otherwise noted below, none  Procedures     CRITICAL CARE TIME             EMERGENCY DEPARTMENT COURSE and DIFFERENTIAL DIAGNOSIS/MDM   Vitals:    Vitals:    03/15/25 0015 03/15/25 0030 03/15/25 0045 03/15/25 0100   BP: 119/73 129/84 127/71 125/83   Pulse: (!) 108 (!) 104 (!) 104 (!) 103   Resp: 21 23 14 22   Temp:       TempSrc:       SpO2: 91% 90% 92% 91%   Weight:              Medications Given in the ED:  Medications   azithromycin (ZITHROMAX) 250 MG tablet (has no administration in time range)   amoxicillin-clavulanate (AUGMENTIN) 875-125 MG per tablet 1 tablet (has no administration in time range)   ipratropium 0.5 mg-albuterol 2.5 mg (DUONEB) nebulizer solution 1 Dose (1 Dose Inhalation Given 3/14/25 2144)   methylPREDNISolone sodium succ (SOLU-MEDROL) injection 80 mg (80 mg IntraVENous Given 3/14/25 2142)   sterile water injection (10 mLs  Given 3/14/25 2143)   potassium chloride (KLOR-CON) extended release tablet 20 mEq (20 mEq Oral Given 3/14/25 2243)   azithromycin (ZITHROMAX) tablet 500 mg (500  documented in ED Course.     Details: NSR no stemi    Risk  OTC drugs.  Prescription drug management.      MDM  Reviewed: nursing note, vitals and previous chart  Reviewed previous: labs  Interpretation: labs, ECG, x-ray and CT scan                  Disposition Considerations (Tests considered, Shared Decision Making, Pt Expectation of Test or Tx.):        I am the Primary Clinician of Record.   Waldemar Martinez MD (electronically signed)    (Please note that parts of this dictation were completed with voice recognition software. Quite often unanticipated grammatical, syntax, homophones, and other interpretive errors are inadvertently transcribed by the computer software. Please disregards these errors. Please excuse any errors that have escaped final proofreading.)             FRANKO Martinez MD  03/15/25 0630

## 2025-03-17 ENCOUNTER — OFFICE VISIT (OUTPATIENT)
Age: 55
End: 2025-03-17

## 2025-03-17 VITALS
WEIGHT: 195.6 LBS | OXYGEN SATURATION: 97 % | TEMPERATURE: 97.9 F | SYSTOLIC BLOOD PRESSURE: 142 MMHG | BODY MASS INDEX: 27.38 KG/M2 | HEIGHT: 71 IN | HEART RATE: 73 BPM | DIASTOLIC BLOOD PRESSURE: 85 MMHG | RESPIRATION RATE: 18 BRPM

## 2025-03-17 DIAGNOSIS — J45.41 MODERATE PERSISTENT ASTHMA WITH EXACERBATION: ICD-10-CM

## 2025-03-17 DIAGNOSIS — G47.00 INSOMNIA, UNSPECIFIED TYPE: Primary | ICD-10-CM

## 2025-03-17 DIAGNOSIS — J18.9 PNEUMONIA OF LEFT LOWER LOBE DUE TO INFECTIOUS ORGANISM: ICD-10-CM

## 2025-03-17 DIAGNOSIS — G62.9 NEUROPATHY: ICD-10-CM

## 2025-03-17 DIAGNOSIS — E87.6 HYPOKALEMIA: ICD-10-CM

## 2025-03-17 DIAGNOSIS — R06.02 SHORTNESS OF BREATH: ICD-10-CM

## 2025-03-17 DIAGNOSIS — R60.0 LOWER EXTREMITY EDEMA: ICD-10-CM

## 2025-03-17 DIAGNOSIS — I10 HYPERTENSION, UNSPECIFIED TYPE: ICD-10-CM

## 2025-03-17 LAB
EKG ATRIAL RATE: 102 BPM
EKG DIAGNOSIS: NORMAL
EKG P AXIS: 40 DEGREES
EKG P-R INTERVAL: 120 MS
EKG Q-T INTERVAL: 364 MS
EKG QRS DURATION: 94 MS
EKG QTC CALCULATION (BAZETT): 474 MS
EKG R AXIS: 7 DEGREES
EKG T AXIS: 5 DEGREES
EKG VENTRICULAR RATE: 102 BPM

## 2025-03-17 PROCEDURE — 3077F SYST BP >= 140 MM HG: CPT | Performed by: NURSE PRACTITIONER

## 2025-03-17 PROCEDURE — 99214 OFFICE O/P EST MOD 30 MIN: CPT | Performed by: NURSE PRACTITIONER

## 2025-03-17 PROCEDURE — 3079F DIAST BP 80-89 MM HG: CPT | Performed by: NURSE PRACTITIONER

## 2025-03-17 PROCEDURE — 36415 COLL VENOUS BLD VENIPUNCTURE: CPT | Performed by: NURSE PRACTITIONER

## 2025-03-17 RX ORDER — POTASSIUM CHLORIDE 750 MG/1
10 TABLET, EXTENDED RELEASE ORAL 2 TIMES DAILY
Qty: 60 TABLET | Refills: 5 | Status: SHIPPED | OUTPATIENT
Start: 2025-03-17

## 2025-03-17 RX ORDER — FLUTICASONE PROPIONATE 50 MCG
1 SPRAY, SUSPENSION (ML) NASAL DAILY
COMMUNITY
Start: 2024-11-26

## 2025-03-17 RX ORDER — TRAZODONE HYDROCHLORIDE 50 MG/1
50 TABLET ORAL NIGHTLY PRN
Qty: 90 TABLET | Refills: 1 | Status: SHIPPED | OUTPATIENT
Start: 2025-03-17

## 2025-03-17 RX ORDER — METHYLPREDNISOLONE 4 MG/1
4 TABLET ORAL SEE ADMIN INSTRUCTIONS
COMMUNITY
Start: 2024-11-17

## 2025-03-17 RX ORDER — ALBUTEROL SULFATE 90 UG/1
2 INHALANT RESPIRATORY (INHALATION) EVERY 4 HOURS PRN
Qty: 18 G | Refills: 0 | Status: SHIPPED | OUTPATIENT
Start: 2025-03-17

## 2025-03-17 SDOH — ECONOMIC STABILITY: FOOD INSECURITY: WITHIN THE PAST 12 MONTHS, YOU WORRIED THAT YOUR FOOD WOULD RUN OUT BEFORE YOU GOT MONEY TO BUY MORE.: NEVER TRUE

## 2025-03-17 SDOH — ECONOMIC STABILITY: FOOD INSECURITY: WITHIN THE PAST 12 MONTHS, THE FOOD YOU BOUGHT JUST DIDN'T LAST AND YOU DIDN'T HAVE MONEY TO GET MORE.: NEVER TRUE

## 2025-03-17 ASSESSMENT — ENCOUNTER SYMPTOMS
ABDOMINAL PAIN: 0
WHEEZING: 1
COUGH: 0
EYE DISCHARGE: 0
EYE ITCHING: 0
COLOR CHANGE: 0
ABDOMINAL DISTENTION: 0
SHORTNESS OF BREATH: 1

## 2025-03-17 ASSESSMENT — PATIENT HEALTH QUESTIONNAIRE - PHQ9
1. LITTLE INTEREST OR PLEASURE IN DOING THINGS: NOT AT ALL
SUM OF ALL RESPONSES TO PHQ QUESTIONS 1-9: 0
2. FEELING DOWN, DEPRESSED OR HOPELESS: NOT AT ALL

## 2025-03-17 NOTE — PROGRESS NOTES
Assessment/ Plan:       ASSESSMENT AND PLAN  1. Moderate persistent asthma  LLL PNA  Continue advair daily  Continue albuterol PRN  Continue duo-nebs since they are helping  Lung sounds coarse  Continue antibiotics  Check labs  Still having SOB. BNP elevated in the ER. Check again today.        2. Neuropathy  Vitamin B12 elevated  TSH normal  Folate normal  Sx's improving with amitriptyline and lyrica    3.HTN  Continue hctz daily  Swelling resolved after stopping the NSAIDS  Check K+   Start on K+ supplement    Insomnia  Try trazodone PRN          Return in about 3 weeks (around 4/7/2025) for listen to lungs.       Subjective:  Mark Ford is a 55 y.o. male here for the following  Chief Complaint   Patient presents with    Follow-up     Recent PNA, hx asthma, neuropathy and HTN     \"Seen in the ER on 3/14/2025.\" Diagnosed with LLL PNA.  Taking Augmentin and azithromycin.  3/15/2025 CTA chest  Patchy left lower lobe airspace opacities suspicious for pneumonia  in the proper clinical setting. No pulmonary embolus or other acute  cardiopulmonary process.  K+ noted to be low. Due for another check.   BNP also elevated on chart review.    Today, feeling much better. Still has some shortness of breath, but overall improving. Still coughing at night.   Swelling in legs has resolved since starting the hctz    Also reports trouble sleeping at home. Stays awake at night \"worried.\" Wants to have something to take PRN for sleep.      Neuropathy  His TSH and folate were normal. B12 has been elevated. He was previously drinking energy drinks which he has stopped drinking. Trying to eat better.  Started on amitriptyline and lyrica which is helping.    Works as a . He is on his feet all day. For over the last 4 months he noticed a dull sensation on the heels of bilateral feet. He denies back injuries, but does report 'back in 2012 he was working on a fish boat and a piece of heavy equipment or machinery fell on

## 2025-03-17 NOTE — PROGRESS NOTES
\"Have you been to the ER, urgent care clinic since your last visit?  Hospitalized since your last visit?\"    NO    “Have you seen or consulted any other health care providers outside our system since your last visit?”    NO      “Have you had a colorectal cancer screening such as a colonoscopy/FIT/Cologuard?    NO    Date of last Colonoscopy: 10/22/2019  No cologuard on file  No FIT/FOBT on file   No flexible sigmoidoscopy on file

## 2025-03-18 ENCOUNTER — RESULTS FOLLOW-UP (OUTPATIENT)
Age: 55
End: 2025-03-18

## 2025-03-18 LAB
ALBUMIN SERPL-MCNC: 3.2 G/DL (ref 3.5–5)
ALBUMIN/GLOB SERPL: 0.8 (ref 1.1–2.2)
ALP SERPL-CCNC: 85 U/L (ref 45–117)
ALT SERPL-CCNC: 55 U/L (ref 12–78)
ANION GAP SERPL CALC-SCNC: 8 MMOL/L (ref 2–12)
AST SERPL-CCNC: 39 U/L (ref 15–37)
BASOPHILS # BLD: 0.07 K/UL (ref 0–0.1)
BASOPHILS NFR BLD: 0.6 % (ref 0–1)
BILIRUB SERPL-MCNC: 0.3 MG/DL (ref 0.2–1)
BUN SERPL-MCNC: 16 MG/DL (ref 6–20)
BUN/CREAT SERPL: 20 (ref 12–20)
CALCIUM SERPL-MCNC: 9.5 MG/DL (ref 8.5–10.1)
CHLORIDE SERPL-SCNC: 109 MMOL/L (ref 97–108)
CO2 SERPL-SCNC: 25 MMOL/L (ref 21–32)
CREAT SERPL-MCNC: 0.81 MG/DL (ref 0.7–1.3)
DIFFERENTIAL METHOD BLD: ABNORMAL
EOSINOPHIL # BLD: 0.06 K/UL (ref 0–0.4)
EOSINOPHIL NFR BLD: 0.5 % (ref 0–7)
ERYTHROCYTE [DISTWIDTH] IN BLOOD BY AUTOMATED COUNT: 13.7 % (ref 11.5–14.5)
GLOBULIN SER CALC-MCNC: 3.9 G/DL (ref 2–4)
GLUCOSE SERPL-MCNC: 110 MG/DL (ref 65–100)
HCT VFR BLD AUTO: 38.3 % (ref 36.6–50.3)
HGB BLD-MCNC: 11.7 G/DL (ref 12.1–17)
IMM GRANULOCYTES # BLD AUTO: 0.14 K/UL (ref 0–0.04)
IMM GRANULOCYTES NFR BLD AUTO: 1.2 % (ref 0–0.5)
LYMPHOCYTES # BLD: 2.95 K/UL (ref 0.8–3.5)
LYMPHOCYTES NFR BLD: 25.3 % (ref 12–49)
MCH RBC QN AUTO: 27.2 PG (ref 26–34)
MCHC RBC AUTO-ENTMCNC: 30.5 G/DL (ref 30–36.5)
MCV RBC AUTO: 89.1 FL (ref 80–99)
MONOCYTES # BLD: 1.19 K/UL (ref 0–1)
MONOCYTES NFR BLD: 10.2 % (ref 5–13)
NEUTS SEG # BLD: 7.23 K/UL (ref 1.8–8)
NEUTS SEG NFR BLD: 62.2 % (ref 32–75)
NRBC # BLD: 0 K/UL (ref 0–0.01)
NRBC BLD-RTO: 0 PER 100 WBC
NT PRO BNP: 493 PG/ML
PLATELET # BLD AUTO: 402 K/UL (ref 150–400)
PMV BLD AUTO: 9.8 FL (ref 8.9–12.9)
POTASSIUM SERPL-SCNC: 4 MMOL/L (ref 3.5–5.1)
PROT SERPL-MCNC: 7.1 G/DL (ref 6.4–8.2)
RBC # BLD AUTO: 4.3 M/UL (ref 4.1–5.7)
SODIUM SERPL-SCNC: 142 MMOL/L (ref 136–145)
WBC # BLD AUTO: 11.6 K/UL (ref 4.1–11.1)

## 2025-03-19 ENCOUNTER — CLINICAL DOCUMENTATION (OUTPATIENT)
Age: 55
End: 2025-03-19

## 2025-03-25 ENCOUNTER — APPOINTMENT (OUTPATIENT)
Facility: HOSPITAL | Age: 55
End: 2025-03-25

## 2025-03-25 ENCOUNTER — HOSPITAL ENCOUNTER (EMERGENCY)
Facility: HOSPITAL | Age: 55
Discharge: HOME | End: 2025-03-25
Attending: FAMILY MEDICINE | Admitting: FAMILY MEDICINE

## 2025-03-25 VITALS
WEIGHT: 177 LBS | HEIGHT: 70 IN | DIASTOLIC BLOOD PRESSURE: 78 MMHG | RESPIRATION RATE: 14 BRPM | BODY MASS INDEX: 25.34 KG/M2 | OXYGEN SATURATION: 96 % | SYSTOLIC BLOOD PRESSURE: 116 MMHG | TEMPERATURE: 98.3 F | HEART RATE: 76 BPM

## 2025-03-25 DIAGNOSIS — R60.0 PEDAL EDEMA: Primary | ICD-10-CM

## 2025-03-25 LAB
ANION GAP SERPL CALC-SCNC: 9 MMOL/L (ref 2–12)
BASOPHILS # BLD: 0.02 K/UL (ref 0–0.1)
BASOPHILS NFR BLD: 0.2 % (ref 0–1)
BUN SERPL-MCNC: 16 MG/DL (ref 6–20)
BUN/CREAT SERPL: 17 (ref 12–20)
CALCIUM SERPL-MCNC: 9.2 MG/DL (ref 8.5–10.1)
CHLORIDE SERPL-SCNC: 98 MMOL/L (ref 97–108)
CO2 SERPL-SCNC: 28 MMOL/L (ref 21–32)
CREAT SERPL-MCNC: 0.95 MG/DL (ref 0.7–1.3)
DIFFERENTIAL METHOD BLD: ABNORMAL
EOSINOPHIL # BLD: 0.49 K/UL (ref 0–0.4)
EOSINOPHIL NFR BLD: 4.4 % (ref 0–7)
ERYTHROCYTE [DISTWIDTH] IN BLOOD BY AUTOMATED COUNT: 13.8 % (ref 11.5–14.5)
GLUCOSE SERPL-MCNC: 108 MG/DL (ref 65–100)
HCT VFR BLD AUTO: 41.5 % (ref 36.6–50.3)
HGB BLD-MCNC: 13.4 G/DL (ref 12.1–17)
IMM GRANULOCYTES # BLD AUTO: 0.05 K/UL (ref 0–0.04)
IMM GRANULOCYTES NFR BLD AUTO: 0.4 % (ref 0–0.5)
LYMPHOCYTES # BLD: 3.26 K/UL (ref 0.8–3.5)
LYMPHOCYTES NFR BLD: 29 % (ref 12–49)
MCH RBC QN AUTO: 27.5 PG (ref 26–34)
MCHC RBC AUTO-ENTMCNC: 32.3 G/DL (ref 30–36.5)
MCV RBC AUTO: 85.2 FL (ref 80–99)
MONOCYTES # BLD: 0.86 K/UL (ref 0–1)
MONOCYTES NFR BLD: 7.6 % (ref 5–13)
NEUTS SEG # BLD: 6.57 K/UL (ref 1.8–8)
NEUTS SEG NFR BLD: 58.4 % (ref 32–75)
NRBC # BLD: 0 K/UL (ref 0–0.01)
NRBC BLD-RTO: 0 PER 100 WBC
PLATELET # BLD AUTO: 351 K/UL (ref 150–400)
PMV BLD AUTO: 8.6 FL (ref 8.9–12.9)
POTASSIUM SERPL-SCNC: 4.4 MMOL/L (ref 3.5–5.1)
RBC # BLD AUTO: 4.87 M/UL (ref 4.1–5.7)
SODIUM SERPL-SCNC: 135 MMOL/L (ref 136–145)
WBC # BLD AUTO: 11.3 K/UL (ref 4.1–11.1)

## 2025-03-25 PROCEDURE — 96374 THER/PROPH/DIAG INJ IV PUSH: CPT

## 2025-03-25 PROCEDURE — 80048 BASIC METABOLIC PNL TOTAL CA: CPT

## 2025-03-25 PROCEDURE — 99284 EMERGENCY DEPT VISIT MOD MDM: CPT

## 2025-03-25 PROCEDURE — 6360000002 HC RX W HCPCS: Performed by: FAMILY MEDICINE

## 2025-03-25 PROCEDURE — 85025 COMPLETE CBC W/AUTO DIFF WBC: CPT

## 2025-03-25 PROCEDURE — 71046 X-RAY EXAM CHEST 2 VIEWS: CPT

## 2025-03-25 RX ORDER — KETOROLAC TROMETHAMINE 15 MG/ML
15 INJECTION, SOLUTION INTRAMUSCULAR; INTRAVENOUS
Status: COMPLETED | OUTPATIENT
Start: 2025-03-25 | End: 2025-03-25

## 2025-03-25 RX ADMIN — KETOROLAC TROMETHAMINE 15 MG: 15 INJECTION, SOLUTION INTRAMUSCULAR; INTRAVENOUS at 09:05

## 2025-03-25 ASSESSMENT — PAIN SCALES - GENERAL
PAINLEVEL_OUTOF10: 10
PAINLEVEL_OUTOF10: 0

## 2025-03-25 ASSESSMENT — PAIN - FUNCTIONAL ASSESSMENT: PAIN_FUNCTIONAL_ASSESSMENT: 0-10

## 2025-03-25 ASSESSMENT — LIFESTYLE VARIABLES
HOW MANY STANDARD DRINKS CONTAINING ALCOHOL DO YOU HAVE ON A TYPICAL DAY: PATIENT DOES NOT DRINK
HOW OFTEN DO YOU HAVE A DRINK CONTAINING ALCOHOL: NEVER

## 2025-03-25 ASSESSMENT — PAIN DESCRIPTION - DESCRIPTORS: DESCRIPTORS: TENDER;DISCOMFORT

## 2025-03-25 ASSESSMENT — PAIN DESCRIPTION - LOCATION: LOCATION: FOOT;LEG

## 2025-03-25 ASSESSMENT — PAIN DESCRIPTION - ORIENTATION: ORIENTATION: LEFT;RIGHT

## 2025-03-25 NOTE — ED TRIAGE NOTES
Pt arrived with complaint of swelling to his feet for 3 days.  Pt reports swelling to his feet for 3 days, pt reports he is on a water pill, Potassium and Lyrica.  Pt reports the swelling did go down with these medications but then he was seen on 03/14/25 for pneumonia and placed on antibiotics and the swelling came back.  Pt reports he tried to work yesterday but due to the swelling and pain in his feet it was very painful and difficulty despite sitting down and taking a break.  Pt is awake alert and oriented  X 4  speaking in full complete sentences  NAD.  Pt ambulate to room 10 with a steady gait  no LEIGH noted.  Pt educated on ER flow

## 2025-03-25 NOTE — ED PROVIDER NOTES
[facility] EMERGENCY DEPARTMENT  EMERGENCY DEPARTMENT ENCOUNTER       Pt Name: Mark Ford  MRN: [id number]  Birthdate 1970  Date of evaluation: 3/25/2025  Provider: Mandy Gonzalez MD   PCP: Rashida Cook, KIESHA - NP  Note Started: 8:25 AM EDT 3/25/25     CHIEF COMPLAINT       Chief Complaint   Patient presents with    Leg Swelling        HISTORY OF PRESENT ILLNESS: 1 or more elements      History From: Patient  HPI Limitations: None     Mark Ford is a 55 y.o. male who presents to the ED with LE edema since treated with abx, steroids 3/14.      Nursing Notes were all reviewed and agreed with or any disagreements were addressed in the HPI.     REVIEW OF SYSTEMS      Review of Systems     Positives and Pertinent negatives as per HPI.    PAST HISTORY     Past Medical History:  Past Medical History:   Diagnosis Date    Asthma     Blurred vision     Head injuries 2012    Headache          Past Surgical History:  Past Surgical History:   Procedure Laterality Date    COLONOSCOPY  2019    normal, repeat in 5 years       Family History:  Family History   Problem Relation Age of Onset    Cancer Mother     Heart Disease Father        Social History:  Social History     Tobacco Use    Smoking status: Never    Smokeless tobacco: Never   Substance Use Topics    Alcohol use: Not Currently    Drug use: Never       Allergies:  No Known Allergies    CURRENT MEDICATIONS      Previous Medications    ACETAMINOPHEN (TYLENOL) 500 MG TABLET    Take 2 tablets by mouth daily as needed for Pain    ALBUTEROL (PROVENTIL) (2.5 MG/3ML) 0.083% NEBULIZER SOLUTION    Take 3 mLs by nebulization 4 times daily as needed for Wheezing    ALBUTEROL SULFATE HFA (PROVENTIL;VENTOLIN;PROAIR) 108 (90 BASE) MCG/ACT INHALER    Inhale 2 puffs into the lungs every 4 hours as needed for Wheezing    AMITRIPTYLINE (ELAVIL) 10 MG TABLET    Take 1 tablet by mouth nightly For lower extremity tingling    FLUTICASONE (FLONASE) 50 MCG/ACT NASAL

## 2025-03-25 NOTE — DISCHARGE INSTRUCTIONS
--Increase your HCTZ to 2 pills daily for 3 days. Double your potassium during those days.   --Aleve 2 tabs twice daily with food.  --Tylenol 1000 mg every 6 hours as needed for pain. Ok to take with Aleve and the other medications.  --CXR next week, before your follow up with Rashida.

## 2025-03-28 ENCOUNTER — TELEPHONE (OUTPATIENT)
Age: 55
End: 2025-03-28

## 2025-03-28 DIAGNOSIS — R60.0 LOWER EXTREMITY EDEMA: Primary | ICD-10-CM

## 2025-03-28 RX ORDER — FUROSEMIDE 20 MG/1
20 TABLET ORAL DAILY PRN
Qty: 30 TABLET | Refills: 0 | Status: SHIPPED | OUTPATIENT
Start: 2025-03-28

## 2025-03-28 NOTE — TELEPHONE ENCOUNTER
Pt has gone to the ER for both legs and feet swelling.  He stated swelling is up to both knees this morning.  Stated skin is turning pink.  Made appointment to see Rashida on Monday. He also stated that the ER doctor told him to double up on meds.  He thinks it has made it worse.

## 2025-03-31 ENCOUNTER — OFFICE VISIT (OUTPATIENT)
Age: 55
End: 2025-03-31

## 2025-03-31 VITALS
HEART RATE: 71 BPM | OXYGEN SATURATION: 98 % | BODY MASS INDEX: 26.86 KG/M2 | TEMPERATURE: 97.8 F | DIASTOLIC BLOOD PRESSURE: 93 MMHG | RESPIRATION RATE: 18 BRPM | HEIGHT: 70 IN | SYSTOLIC BLOOD PRESSURE: 145 MMHG | WEIGHT: 187.6 LBS

## 2025-03-31 DIAGNOSIS — R60.0 LOWER EXTREMITY EDEMA: ICD-10-CM

## 2025-03-31 DIAGNOSIS — G62.9 NEUROPATHY: ICD-10-CM

## 2025-03-31 PROCEDURE — 99214 OFFICE O/P EST MOD 30 MIN: CPT | Performed by: NURSE PRACTITIONER

## 2025-03-31 RX ORDER — PREGABALIN 25 MG/1
25 CAPSULE ORAL 3 TIMES DAILY PRN
Qty: 90 CAPSULE | Refills: 2 | Status: SHIPPED | OUTPATIENT
Start: 2025-03-31 | End: 2025-06-29

## 2025-03-31 RX ORDER — FUROSEMIDE 20 MG/1
20 TABLET ORAL DAILY PRN
Qty: 30 TABLET | Refills: 2 | Status: SHIPPED | OUTPATIENT
Start: 2025-03-31

## 2025-03-31 ASSESSMENT — ENCOUNTER SYMPTOMS
EYE DISCHARGE: 0
ABDOMINAL PAIN: 0
ABDOMINAL DISTENTION: 0
COLOR CHANGE: 0
EYE ITCHING: 0
COUGH: 0

## 2025-03-31 ASSESSMENT — PATIENT HEALTH QUESTIONNAIRE - PHQ9
SUM OF ALL RESPONSES TO PHQ QUESTIONS 1-9: 2
1. LITTLE INTEREST OR PLEASURE IN DOING THINGS: SEVERAL DAYS
2. FEELING DOWN, DEPRESSED OR HOPELESS: SEVERAL DAYS
SUM OF ALL RESPONSES TO PHQ QUESTIONS 1-9: 2

## 2025-03-31 NOTE — PROGRESS NOTES
\"Have you been to the ER, urgent care clinic since your last visit?  Hospitalized since your last visit?\"    YES - When: approximately 6 days ago.  Where and Why: [facility], Pedal edema.    “Have you seen or consulted any other health care providers outside of [facility] since your last visit?”    NO        “Have you had a colorectal cancer screening such as a colonoscopy/FIT/Cologuard?    NO    Date of last Colonoscopy: 10/22/2019  No cologuard on file  No FIT/FOBT on file   No flexible sigmoidoscopy on file     Chief Complaint   Patient presents with    Leg Swelling     And ankles,   ED fu       Vitals:    03/31/25 1503   BP: (!) 145/93   Pulse: 71   Resp: 18   Temp: 97.8 °F (36.6 °C)   SpO2: 98%       Click Here for Release of Records Request

## 2025-03-31 NOTE — PROGRESS NOTES
Assessment/ Plan:       ASSESSMENT AND PLAN  1. Moderate persistent asthma  Continue advair daily  Continue albuterol PRN  Continue duo-nebs since they are helping  Lung sounds clear today    2. Neuropathy  Vitamin B12 elevated  TSH normal  Folate normal  Sx's improving with amitriptyline and lyrica--increase dose    3.HTN  Leg swelling  Hctz dc'd and started on lasix when he sent a [employer] message stating he still had swelling in his legs.  Continue K+supplement    Insomnia  Cont trazodone PRN      Return in about 3 weeks (around 4/21/2025) for discuss neuropathy.       Subjective:  Mark Ford is a 55 y.o. male here for the following  Chief Complaint   Patient presents with    Follow-up     Asthma, neuropathy, high blood pressure, leg swelling, insomnia     Seen in the ER for pedal edema on 3/25/2025.    Initially seen in the ER 3/14/2025 and prescribed steroids, augmentin and azithromycin for PNA. He was feeling better, but went to the ER again because he had swelling in his legs which concerned him. His chest xray showed some improvement and recommended continued monitoring. He was advised to increase the hctz.  K 4.4    He sent a [employer] message last week stating he was still having LE swelling.     Advised to stop hctz and start lasix. Advised to also continue K+ supplement.    Today, he tells me that he never got the medicine. States [facility] never \"told him it was ready.\" Still has swelling in legs.   He took 2 doses of the hctz which he was advised to try by the ED, but tells me this \"made it worse so he stopped.\"    Pain is still bothering him also. Taking \"lots of tylenol.\" Wants to know if he can have a narcotic to help with the pain. Advised I would recommend increasing the lyrica and amitriptyline which he is fine with.     Neuropathy  His TSH and folate were normal. B12 has been elevated. He was previously drinking energy drinks which he has stopped drinking. Trying to eat better.  Started on

## 2025-04-17 NOTE — PROGRESS NOTES
Assessment/ Plan:       ASSESSMENT AND PLAN  1. Moderate persistent asthma  Continue advair daily  Continue albuterol PRN  Continue duo-nebs since they are helping  Lung sounds with wheezing to right lower lobe and rhonchi--repeat chest xray ordered  02 saturation 95%  Follow up with pulmonology.--hx of opacity in the right lower lobe on previous images. Previously seeing pulmonology but stopped d/t insurance    2. Neuropathy  Vitamin B12 elevated  TSH normal  Folate normal  Sx's previously improving with amitriptyline and lyrica.   He is not taking the lyrica TID. Only BID. Will increase dose to 50 mg BID.   Recommend follow up with neurology--referral previously placed    3.HTN  Leg swelling  Hctz dc'd and started on lasix when he sent a [employer] message stating he still had swelling in his legs.  Continue K+supplement  Check labs  Stop taking Aleve which could also be contributing to the swelling    Insomnia  Cont trazodone PRN    PNA of right lung  Shortness of breath  Repeat xray ordered  Recheck BNP      Return in about 1 month (around 5/22/2025) for discuss meds and neuropathy.       Subjective:  Mark Ford is a 55 y.o. male here for the following  Chief Complaint   Patient presents with    Follow-up     Asthma, neuropathy, HTH, leg swelling, insomnia, PNA, shortness of breath     Seen in the ER for pedal edema on 3/25/2025.    Initially seen in the ER 3/14/2025 and prescribed steroids, augmentin and azithromycin for PNA. He was feeling better, but went to the ER again because he had swelling in his legs which concerned him. His chest xray showed some improvement and recommended continued monitoring. He was advised to increase the hctz.  K 4.4    He sent a [employer] message a few weeks ago stating he was still having LE swelling and he was advised to stop hctz and start lasix. Advised to also continue K+ supplement.    He took 2 doses of the hctz which he was advised to try by the ED, but tells me this

## 2025-04-22 ENCOUNTER — OFFICE VISIT (OUTPATIENT)
Age: 55
End: 2025-04-22

## 2025-04-22 VITALS
RESPIRATION RATE: 22 BRPM | TEMPERATURE: 99.1 F | WEIGHT: 186.6 LBS | SYSTOLIC BLOOD PRESSURE: 115 MMHG | DIASTOLIC BLOOD PRESSURE: 76 MMHG | OXYGEN SATURATION: 95 % | BODY MASS INDEX: 26.77 KG/M2 | HEART RATE: 97 BPM

## 2025-04-22 DIAGNOSIS — R60.0 LOWER EXTREMITY EDEMA: ICD-10-CM

## 2025-04-22 DIAGNOSIS — R06.02 SHORTNESS OF BREATH: ICD-10-CM

## 2025-04-22 DIAGNOSIS — J18.9 PNEUMONIA OF RIGHT LUNG DUE TO INFECTIOUS ORGANISM, UNSPECIFIED PART OF LUNG: ICD-10-CM

## 2025-04-22 DIAGNOSIS — I10 HYPERTENSION, UNSPECIFIED TYPE: ICD-10-CM

## 2025-04-22 DIAGNOSIS — G62.9 NEUROPATHY: ICD-10-CM

## 2025-04-22 DIAGNOSIS — G47.00 INSOMNIA, UNSPECIFIED TYPE: ICD-10-CM

## 2025-04-22 DIAGNOSIS — J45.41 MODERATE PERSISTENT ASTHMA WITH EXACERBATION: Primary | ICD-10-CM

## 2025-04-22 PROCEDURE — 99214 OFFICE O/P EST MOD 30 MIN: CPT | Performed by: NURSE PRACTITIONER

## 2025-04-22 PROCEDURE — 3074F SYST BP LT 130 MM HG: CPT | Performed by: NURSE PRACTITIONER

## 2025-04-22 PROCEDURE — 3078F DIAST BP <80 MM HG: CPT | Performed by: NURSE PRACTITIONER

## 2025-04-22 RX ORDER — ALBUTEROL SULFATE 90 UG/1
2 INHALANT RESPIRATORY (INHALATION) EVERY 4 HOURS PRN
Qty: 18 G | Refills: 0 | Status: SHIPPED | OUTPATIENT
Start: 2025-04-22

## 2025-04-22 RX ORDER — ALBUTEROL SULFATE 0.83 MG/ML
2.5 SOLUTION RESPIRATORY (INHALATION) 4 TIMES DAILY PRN
Qty: 120 EACH | Refills: 3 | Status: SHIPPED | OUTPATIENT
Start: 2025-04-22

## 2025-04-22 RX ORDER — PREGABALIN 50 MG/1
50 CAPSULE ORAL 2 TIMES DAILY
Qty: 180 CAPSULE | Refills: 1 | Status: SHIPPED | OUTPATIENT
Start: 2025-04-22 | End: 2025-10-19

## 2025-04-22 ASSESSMENT — PATIENT HEALTH QUESTIONNAIRE - PHQ9
1. LITTLE INTEREST OR PLEASURE IN DOING THINGS: NOT AT ALL
SUM OF ALL RESPONSES TO PHQ QUESTIONS 1-9: 0
2. FEELING DOWN, DEPRESSED OR HOPELESS: NOT AT ALL
SUM OF ALL RESPONSES TO PHQ QUESTIONS 1-9: 0

## 2025-04-22 NOTE — PROGRESS NOTES
\"Have you been to the ER, urgent care clinic since your last visit?  Hospitalized since your last visit?\"    NO    “Have you seen or consulted any other health care providers outside of [facility] since your last visit?”    NO        “Have you had a colorectal cancer screening such as a colonoscopy/FIT/Cologuard?    NO    Date of last Colonoscopy: 10/22/2019  No cologuard on file  No FIT/FOBT on file   No flexible sigmoidoscopy on file     Chief Complaint   Patient presents with    Foot Swelling    Tingling     And numbness  And left knee    Medication Refill       Vitals:    04/22/25 1531   BP: 115/76   Pulse: 97   Resp: 22   Temp: 99.1 °F (37.3 °C)   SpO2: 95%         Click Here for Release of Records Request

## 2025-04-23 ENCOUNTER — RESULTS FOLLOW-UP (OUTPATIENT)
Age: 55
End: 2025-04-23

## 2025-04-23 DIAGNOSIS — G62.9 NEUROPATHY: Primary | ICD-10-CM

## 2025-04-23 LAB
ALBUMIN SERPL-MCNC: 3.5 G/DL (ref 3.5–5)
ALBUMIN/GLOB SERPL: 0.8 (ref 1.1–2.2)
ALP SERPL-CCNC: 124 U/L (ref 45–117)
ALT SERPL-CCNC: 46 U/L (ref 12–78)
ANION GAP SERPL CALC-SCNC: 3 MMOL/L (ref 2–12)
AST SERPL-CCNC: 43 U/L (ref 15–37)
BASOPHILS # BLD: 0.06 K/UL (ref 0–0.1)
BASOPHILS NFR BLD: 0.8 % (ref 0–1)
BILIRUB SERPL-MCNC: 0.7 MG/DL (ref 0.2–1)
BUN SERPL-MCNC: 20 MG/DL (ref 6–20)
BUN/CREAT SERPL: 19 (ref 12–20)
CALCIUM SERPL-MCNC: 9.2 MG/DL (ref 8.5–10.1)
CHLORIDE SERPL-SCNC: 103 MMOL/L (ref 97–108)
CO2 SERPL-SCNC: 32 MMOL/L (ref 21–32)
CREAT SERPL-MCNC: 1.05 MG/DL (ref 0.7–1.3)
DIFFERENTIAL METHOD BLD: ABNORMAL
EOSINOPHIL # BLD: 0.43 K/UL (ref 0–0.4)
EOSINOPHIL NFR BLD: 6 % (ref 0–7)
ERYTHROCYTE [DISTWIDTH] IN BLOOD BY AUTOMATED COUNT: 14.6 % (ref 11.5–14.5)
GLOBULIN SER CALC-MCNC: 4.3 G/DL (ref 2–4)
GLUCOSE SERPL-MCNC: 100 MG/DL (ref 65–100)
HCT VFR BLD AUTO: 39.4 % (ref 36.6–50.3)
HGB BLD-MCNC: 12.4 G/DL (ref 12.1–17)
IMM GRANULOCYTES # BLD AUTO: 0.02 K/UL (ref 0–0.04)
IMM GRANULOCYTES NFR BLD AUTO: 0.3 % (ref 0–0.5)
LYMPHOCYTES # BLD: 1.25 K/UL (ref 0.8–3.5)
LYMPHOCYTES NFR BLD: 17.5 % (ref 12–49)
MCH RBC QN AUTO: 27.3 PG (ref 26–34)
MCHC RBC AUTO-ENTMCNC: 31.5 G/DL (ref 30–36.5)
MCV RBC AUTO: 86.6 FL (ref 80–99)
MONOCYTES # BLD: 0.92 K/UL (ref 0–1)
MONOCYTES NFR BLD: 12.9 % (ref 5–13)
NEUTS SEG # BLD: 4.45 K/UL (ref 1.8–8)
NEUTS SEG NFR BLD: 62.5 % (ref 32–75)
NRBC # BLD: 0 K/UL (ref 0–0.01)
NRBC BLD-RTO: 0 PER 100 WBC
NT PRO BNP: 29 PG/ML
PLATELET # BLD AUTO: 383 K/UL (ref 150–400)
PMV BLD AUTO: 9.5 FL (ref 8.9–12.9)
POTASSIUM SERPL-SCNC: 4 MMOL/L (ref 3.5–5.1)
PROT SERPL-MCNC: 7.8 G/DL (ref 6.4–8.2)
RBC # BLD AUTO: 4.55 M/UL (ref 4.1–5.7)
SODIUM SERPL-SCNC: 138 MMOL/L (ref 136–145)
WBC # BLD AUTO: 7.1 K/UL (ref 4.1–11.1)

## 2025-04-23 ASSESSMENT — ENCOUNTER SYMPTOMS: SHORTNESS OF BREATH: 1

## 2025-04-25 ENCOUNTER — HOSPITAL ENCOUNTER (EMERGENCY)
Facility: HOSPITAL | Age: 55
Discharge: HOME | End: 2025-04-25
Attending: EMERGENCY MEDICINE

## 2025-04-25 VITALS
HEIGHT: 70 IN | TEMPERATURE: 99 F | RESPIRATION RATE: 16 BRPM | SYSTOLIC BLOOD PRESSURE: 117 MMHG | HEART RATE: 95 BPM | BODY MASS INDEX: 26.77 KG/M2 | DIASTOLIC BLOOD PRESSURE: 85 MMHG | OXYGEN SATURATION: 95 %

## 2025-04-25 DIAGNOSIS — R60.0 PERIPHERAL EDEMA: Primary | ICD-10-CM

## 2025-04-25 PROCEDURE — 86618 LYME DISEASE ANTIBODY: CPT

## 2025-04-25 PROCEDURE — 36415 COLL VENOUS BLD VENIPUNCTURE: CPT

## 2025-04-25 PROCEDURE — 99283 EMERGENCY DEPT VISIT LOW MDM: CPT

## 2025-04-25 PROCEDURE — 99284 EMERGENCY DEPT VISIT MOD MDM: CPT

## 2025-04-25 RX ORDER — HYDROCODONE BITARTRATE AND ACETAMINOPHEN 5; 325 MG/1; MG/1
1 TABLET ORAL EVERY 6 HOURS PRN
Qty: 12 TABLET | Refills: 0 | Status: SHIPPED | OUTPATIENT
Start: 2025-04-25 | End: 2025-04-28

## 2025-04-25 ASSESSMENT — LIFESTYLE VARIABLES
HOW OFTEN DO YOU HAVE A DRINK CONTAINING ALCOHOL: NEVER
HOW MANY STANDARD DRINKS CONTAINING ALCOHOL DO YOU HAVE ON A TYPICAL DAY: PATIENT DOES NOT DRINK

## 2025-04-25 NOTE — ED TRIAGE NOTES
C/o continued bilateral LE swelling and tingling in fingers 3,4 5 on both hands , seen here and PCP for same with no improvement , no new injury

## 2025-04-25 NOTE — ED PROVIDER NOTES
fingers bilaterally.  Suspect this is most likely a cubital tunnel syndrome/ulnar neuropathy and unrelated to his leg swelling.  Nonetheless with multiple symptoms recommended we test for Lyme.  Patient has had otherwise normal blood work recently with normal CBC CMP.  B12 and folate were also checked by PCP.  Recommended neurology follow-up.  BNP was previously normal as well.  No shortness of breath currently.  Vital signs within normal limits.    Will also arrange for outpatient lower extremity duplex imaging to rule out DVT although unlikely.    ED Course:   Initial assessment performed. The patients presenting problems have been discussed, and they are in agreement with the care plan formulated and outlined with them.  I have encouraged them to ask questions as they arise throughout their visit.           PROGRESS NOTE              Progress note:    Pt  ready for discharge.   Will follow up as instructed. All questions have been answered, pt voiced understanding and agreement with plan.           Specific return precautions provided as well as instructions to return to the ED should sx worsen at any time. Vital signs stable for discharge.     I have also put together some discharge instructions for them that include: 1) educational information regarding their diagnosis, 2) how to care for their diagnosis at home, as well a 3) list of reasons why they would want to return to the ED prior to their follow-up appointment, should their condition change.    Written by Anand Theodore MD        Critical Care Time:   0    Disposition:  Discharge    PLAN:  1.      Medication List        ASK your doctor about these medications      acetaminophen 500 MG tablet  Commonly known as: TYLENOL  Take 2 tablets by mouth daily as needed for Pain     * albuterol sulfate  (90 Base) MCG/ACT inhaler  Commonly known as: PROVENTIL;VENTOLIN;PROAIR  Inhale 2 puffs into the lungs every 4 hours as needed for Wheezing     * albuterol  (2.5 MG/3ML) 0.083% nebulizer solution  Commonly known as: PROVENTIL  Take 3 mLs by nebulization 4 times daily as needed for Wheezing     amitriptyline 25 MG tablet  Commonly known as: ELAVIL  Take 1 tablet by mouth nightly For lower extremity tingling     fluticasone 50 MCG/ACT nasal spray  Commonly known as: FLONASE     fluticasone-salmeterol 250-50 MCG/ACT Aepb diskus inhaler  Commonly known as: ADVAIR  Inhale 1 puff into the lungs in the morning and 1 puff in the evening.     furosemide 20 MG tablet  Commonly known as: LASIX  Take 1 tablet by mouth daily as needed (swelling in legs)     ipratropium 0.5 mg-albuterol 2.5 mg 0.5-2.5 (3) MG/3ML Soln nebulizer solution  Commonly known as: DUONEB  Inhale 3 mLs into the lungs every 6 hours as needed for Shortness of Breath     montelukast 10 MG tablet  Commonly known as: SINGULAIR  Take 1 tablet by mouth daily For allergies     potassium chloride 10 MEQ extended release tablet  Commonly known as: KLOR-CON M  Take 1 tablet by mouth 2 times daily     * pregabalin 25 MG capsule  Commonly known as: LYRICA  Take 1 capsule by mouth 3 times daily as needed (nerve pain) for up to 90 days. Max Daily Amount: 75 mg     * pregabalin 50 MG capsule  Commonly known as: Lyrica  Take 1 capsule by mouth 2 times daily for 180 days. Max Daily Amount: 100 mg     traZODone 50 MG tablet  Commonly known as: DESYREL  Take 1 tablet by mouth nightly as needed for Sleep           * This list has 4 medication(s) that are the same as other medications prescribed for you. Read the directions carefully, and ask your doctor or other care provider to review them with you.                2. @[facility]@  Return to ED if worse     Diagnosis     Clinical Impression:   1. Peripheral edema              Please note that this dictation was completed with [employer], the [employer] voice recognition software.  Quite often unanticipated grammatical, syntax, homophones, and other interpretive errors are inadvertently

## 2025-04-26 ENCOUNTER — HOSPITAL ENCOUNTER (OUTPATIENT)
Facility: HOSPITAL | Age: 55
Discharge: HOME | End: 2025-04-29

## 2025-04-26 DIAGNOSIS — J18.9 PNEUMONIA OF RIGHT LUNG DUE TO INFECTIOUS ORGANISM, UNSPECIFIED PART OF LUNG: ICD-10-CM

## 2025-04-26 PROCEDURE — 71046 X-RAY EXAM CHEST 2 VIEWS: CPT

## 2025-04-28 ENCOUNTER — RESULTS FOLLOW-UP (OUTPATIENT)
Age: 55
End: 2025-04-28

## 2025-04-28 ENCOUNTER — TRANSCRIBE ORDERS (OUTPATIENT)
Facility: HOSPITAL | Age: 55
End: 2025-04-28

## 2025-04-28 DIAGNOSIS — R22.43 LOCALIZED SWELLING OF BOTH LOWER LEGS: Primary | ICD-10-CM

## 2025-04-29 ENCOUNTER — HOSPITAL ENCOUNTER (EMERGENCY)
Facility: HOSPITAL | Age: 55
Discharge: HOME | End: 2025-04-30
Attending: EMERGENCY MEDICINE

## 2025-04-29 ENCOUNTER — APPOINTMENT (OUTPATIENT)
Facility: HOSPITAL | Age: 55
End: 2025-04-29
Attending: EMERGENCY MEDICINE

## 2025-04-29 DIAGNOSIS — M79.661 RIGHT CALF PAIN: Primary | ICD-10-CM

## 2025-04-29 DIAGNOSIS — E86.0 DEHYDRATION: ICD-10-CM

## 2025-04-29 LAB
ALBUMIN SERPL-MCNC: 2.8 G/DL (ref 3.5–5)
ALBUMIN/GLOB SERPL: 0.5 (ref 1.1–2.2)
ALP SERPL-CCNC: 119 U/L (ref 45–117)
ALT SERPL-CCNC: 39 U/L (ref 12–78)
ANION GAP SERPL CALC-SCNC: 6 MMOL/L (ref 2–12)
AST SERPL-CCNC: 40 U/L (ref 15–37)
BASOPHILS # BLD: 0.03 K/UL (ref 0–0.1)
BASOPHILS NFR BLD: 0.5 % (ref 0–1)
BILIRUB SERPL-MCNC: 0.5 MG/DL (ref 0.2–1)
BUN SERPL-MCNC: 38 MG/DL (ref 6–20)
BUN/CREAT SERPL: 22 (ref 12–20)
CALCIUM SERPL-MCNC: 9.4 MG/DL (ref 8.5–10.1)
CHLORIDE SERPL-SCNC: 102 MMOL/L (ref 97–108)
CK SERPL-CCNC: 59 U/L (ref 39–308)
CO2 SERPL-SCNC: 31 MMOL/L (ref 21–32)
CREAT SERPL-MCNC: 1.72 MG/DL (ref 0.7–1.3)
D DIMER PPP FEU-MCNC: 2.38 MG/L FEU (ref 0–0.65)
DIFFERENTIAL METHOD BLD: ABNORMAL
EOSINOPHIL # BLD: 0.5 K/UL (ref 0–0.4)
EOSINOPHIL NFR BLD: 8 % (ref 0–7)
ERYTHROCYTE [DISTWIDTH] IN BLOOD BY AUTOMATED COUNT: 14.3 % (ref 11.5–14.5)
GLOBULIN SER CALC-MCNC: 5.2 G/DL (ref 2–4)
GLUCOSE SERPL-MCNC: 107 MG/DL (ref 65–100)
HCT VFR BLD AUTO: 34.3 % (ref 36.6–50.3)
HGB BLD-MCNC: 11.2 G/DL (ref 12.1–17)
IMM GRANULOCYTES # BLD AUTO: 0.02 K/UL (ref 0–0.04)
IMM GRANULOCYTES NFR BLD AUTO: 0.3 % (ref 0–0.5)
LYME ANTIBODY: NEGATIVE
LYMPHOCYTES # BLD: 1.2 K/UL (ref 0.8–3.5)
LYMPHOCYTES NFR BLD: 19.1 % (ref 12–49)
MAGNESIUM SERPL-MCNC: 1.7 MG/DL (ref 1.6–2.4)
MCH RBC QN AUTO: 27.3 PG (ref 26–34)
MCHC RBC AUTO-ENTMCNC: 32.7 G/DL (ref 30–36.5)
MCV RBC AUTO: 83.7 FL (ref 80–99)
MONOCYTES # BLD: 0.46 K/UL (ref 0–1)
MONOCYTES NFR BLD: 7.3 % (ref 5–13)
NEUTS SEG # BLD: 4.07 K/UL (ref 1.8–8)
NEUTS SEG NFR BLD: 64.8 % (ref 32–75)
NRBC # BLD: 0 K/UL (ref 0–0.01)
NRBC BLD-RTO: 0 PER 100 WBC
PLATELET # BLD AUTO: 388 K/UL (ref 150–400)
PMV BLD AUTO: 8.7 FL (ref 8.9–12.9)
POTASSIUM SERPL-SCNC: 4.3 MMOL/L (ref 3.5–5.1)
PROT SERPL-MCNC: 8 G/DL (ref 6.4–8.2)
RBC # BLD AUTO: 4.1 M/UL (ref 4.1–5.7)
SODIUM SERPL-SCNC: 139 MMOL/L (ref 136–145)
WBC # BLD AUTO: 6.3 K/UL (ref 4.1–11.1)

## 2025-04-29 PROCEDURE — 96361 HYDRATE IV INFUSION ADD-ON: CPT

## 2025-04-29 PROCEDURE — 96374 THER/PROPH/DIAG INJ IV PUSH: CPT

## 2025-04-29 PROCEDURE — 6370000000 HC RX 637 (ALT 250 FOR IP): Performed by: EMERGENCY MEDICINE

## 2025-04-29 PROCEDURE — 36415 COLL VENOUS BLD VENIPUNCTURE: CPT

## 2025-04-29 PROCEDURE — 85025 COMPLETE CBC W/AUTO DIFF WBC: CPT

## 2025-04-29 PROCEDURE — 73590 X-RAY EXAM OF LOWER LEG: CPT

## 2025-04-29 PROCEDURE — 99284 EMERGENCY DEPT VISIT MOD MDM: CPT

## 2025-04-29 PROCEDURE — 80053 COMPREHEN METABOLIC PANEL: CPT

## 2025-04-29 PROCEDURE — 85379 FIBRIN DEGRADATION QUANT: CPT

## 2025-04-29 PROCEDURE — 2580000003 HC RX 258: Performed by: EMERGENCY MEDICINE

## 2025-04-29 PROCEDURE — 82550 ASSAY OF CK (CPK): CPT

## 2025-04-29 PROCEDURE — 83735 ASSAY OF MAGNESIUM: CPT

## 2025-04-29 PROCEDURE — 6360000002 HC RX W HCPCS: Performed by: EMERGENCY MEDICINE

## 2025-04-29 RX ORDER — 0.9 % SODIUM CHLORIDE 0.9 %
1000 INTRAVENOUS SOLUTION INTRAVENOUS ONCE
Status: COMPLETED | OUTPATIENT
Start: 2025-04-29 | End: 2025-04-29

## 2025-04-29 RX ORDER — OXYCODONE AND ACETAMINOPHEN 5; 325 MG/1; MG/1
1 TABLET ORAL
Refills: 0 | Status: COMPLETED | OUTPATIENT
Start: 2025-04-29 | End: 2025-04-29

## 2025-04-29 RX ORDER — DICLOFENAC SODIUM 75 MG/1
75 TABLET, DELAYED RELEASE ORAL 2 TIMES DAILY
COMMUNITY
Start: 2025-04-27 | End: 2025-04-30 | Stop reason: SINTOL

## 2025-04-29 RX ORDER — FENTANYL CITRATE 50 UG/ML
50 INJECTION, SOLUTION INTRAMUSCULAR; INTRAVENOUS ONCE
Refills: 0 | Status: COMPLETED | OUTPATIENT
Start: 2025-04-29 | End: 2025-04-29

## 2025-04-29 RX ORDER — HYDROCODONE BITARTRATE AND ACETAMINOPHEN 5; 325 MG/1; MG/1
1 TABLET ORAL EVERY 6 HOURS PRN
COMMUNITY

## 2025-04-29 RX ORDER — DOXYCYCLINE 100 MG/1
100 CAPSULE ORAL 2 TIMES DAILY
COMMUNITY
Start: 2025-04-27 | End: 2025-05-04

## 2025-04-29 RX ADMIN — SODIUM CHLORIDE 1000 ML: 0.9 INJECTION, SOLUTION INTRAVENOUS at 22:38

## 2025-04-29 RX ADMIN — OXYCODONE HYDROCHLORIDE AND ACETAMINOPHEN 1 TABLET: 5; 325 TABLET ORAL at 21:41

## 2025-04-29 RX ADMIN — FENTANYL CITRATE 50 MCG: 50 INJECTION INTRAMUSCULAR; INTRAVENOUS at 23:33

## 2025-04-29 ASSESSMENT — PAIN DESCRIPTION - ORIENTATION: ORIENTATION: RIGHT

## 2025-04-29 ASSESSMENT — ENCOUNTER SYMPTOMS
ABDOMINAL PAIN: 0
SORE THROAT: 0
SHORTNESS OF BREATH: 0

## 2025-04-29 ASSESSMENT — LIFESTYLE VARIABLES
HOW MANY STANDARD DRINKS CONTAINING ALCOHOL DO YOU HAVE ON A TYPICAL DAY: 1 OR 2
HOW OFTEN DO YOU HAVE A DRINK CONTAINING ALCOHOL: MONTHLY OR LESS

## 2025-04-29 ASSESSMENT — PAIN DESCRIPTION - DESCRIPTORS: DESCRIPTORS: STABBING

## 2025-04-29 ASSESSMENT — PAIN - FUNCTIONAL ASSESSMENT
PAIN_FUNCTIONAL_ASSESSMENT: 0-10
PAIN_FUNCTIONAL_ASSESSMENT: ACTIVITIES ARE NOT PREVENTED

## 2025-04-29 ASSESSMENT — PAIN DESCRIPTION - LOCATION: LOCATION: LEG

## 2025-04-29 ASSESSMENT — PAIN SCALES - GENERAL: PAINLEVEL_OUTOF10: 10

## 2025-04-30 ENCOUNTER — TRANSCRIBE ORDERS (OUTPATIENT)
Facility: HOSPITAL | Age: 55
End: 2025-04-30

## 2025-04-30 ENCOUNTER — HOSPITAL ENCOUNTER (EMERGENCY)
Facility: HOSPITAL | Age: 55
Discharge: HOME | End: 2025-05-03
Attending: EMERGENCY MEDICINE

## 2025-04-30 VITALS
RESPIRATION RATE: 16 BRPM | SYSTOLIC BLOOD PRESSURE: 144 MMHG | BODY MASS INDEX: 27.2 KG/M2 | TEMPERATURE: 98.4 F | WEIGHT: 190 LBS | DIASTOLIC BLOOD PRESSURE: 83 MMHG | OXYGEN SATURATION: 95 % | HEART RATE: 95 BPM | HEIGHT: 70 IN

## 2025-04-30 DIAGNOSIS — I82.4Y1 DEEP VEIN THROMBOSIS (DVT) OF PROXIMAL VEIN OF RIGHT LOWER EXTREMITY, UNSPECIFIED CHRONICITY (HCC): Primary | ICD-10-CM

## 2025-04-30 DIAGNOSIS — I82.4Y1 DEEP VEIN THROMBOSIS (DVT) OF PROXIMAL VEIN OF RIGHT LOWER EXTREMITY, UNSPECIFIED CHRONICITY (HCC): ICD-10-CM

## 2025-04-30 PROCEDURE — 93971 EXTREMITY STUDY: CPT

## 2025-04-30 RX ORDER — OXYCODONE AND ACETAMINOPHEN 5; 325 MG/1; MG/1
1 TABLET ORAL EVERY 6 HOURS PRN
Qty: 12 TABLET | Refills: 0 | Status: SHIPPED | OUTPATIENT
Start: 2025-04-30 | End: 2025-05-03

## 2025-04-30 NOTE — ED TRIAGE NOTES
Patient came in via EMS for R leg pain. Patient states that the pain starts at his calf and radiates down to the foot. He has been seen at this ED as well as [facility] with in the last week for the same issue.

## 2025-04-30 NOTE — DISCHARGE INSTRUCTIONS
Follow-up with ultrasound of your right leg to evaluate for blood clot.  See your family doctor in 2 days for repeat laboratory studies to check your kidney function.

## 2025-05-06 ENCOUNTER — OFFICE VISIT (OUTPATIENT)
Dept: FAMILY MEDICINE CLINIC | Age: 55
End: 2025-05-06

## 2025-05-06 VITALS
WEIGHT: 184 LBS | RESPIRATION RATE: 16 BRPM | BODY MASS INDEX: 26.34 KG/M2 | SYSTOLIC BLOOD PRESSURE: 106 MMHG | DIASTOLIC BLOOD PRESSURE: 72 MMHG | HEIGHT: 70 IN | TEMPERATURE: 97 F | OXYGEN SATURATION: 95 % | HEART RATE: 102 BPM

## 2025-05-06 DIAGNOSIS — D69.2 PURPURA: ICD-10-CM

## 2025-05-06 DIAGNOSIS — G62.9 NEUROPATHY: ICD-10-CM

## 2025-05-06 DIAGNOSIS — M25.50 POLYARTHRALGIA: Primary | ICD-10-CM

## 2025-05-06 PROCEDURE — 99214 OFFICE O/P EST MOD 30 MIN: CPT | Performed by: FAMILY MEDICINE

## 2025-05-06 RX ORDER — GABAPENTIN 250 MG/5ML
50 SOLUTION ORAL 2 TIMES DAILY
COMMUNITY

## 2025-05-06 RX ORDER — HYDROCHLOROTHIAZIDE 25 MG/1
25 TABLET ORAL DAILY
COMMUNITY

## 2025-05-08 LAB
APTT PPP: 29.3 SEC (ref 22.1–31)
BASOPHILS # BLD: 0.06 K/UL (ref 0–0.1)
BASOPHILS NFR BLD: 0.8 % (ref 0–1)
DIFFERENTIAL METHOD BLD: ABNORMAL
EOSINOPHIL # BLD: 1.21 K/UL (ref 0–0.4)
EOSINOPHIL NFR BLD: 15.9 % (ref 0–7)
ERYTHROCYTE [DISTWIDTH] IN BLOOD BY AUTOMATED COUNT: 13.8 % (ref 11.5–14.5)
ERYTHROCYTE [SEDIMENTATION RATE] IN BLOOD: 67 MM/HR (ref 0–20)
HCT VFR BLD AUTO: 38.9 % (ref 36.6–50.3)
HGB BLD-MCNC: 12.3 G/DL (ref 12.1–17)
IMM GRANULOCYTES # BLD AUTO: 0.03 K/UL (ref 0–0.04)
IMM GRANULOCYTES NFR BLD AUTO: 0.4 % (ref 0–0.5)
INR PPP: 1.1 (ref 0.9–1.1)
LYMPHOCYTES # BLD: 1.15 K/UL (ref 0.8–3.5)
LYMPHOCYTES NFR BLD: 15.1 % (ref 12–49)
MCH RBC QN AUTO: 27.4 PG (ref 26–34)
MCHC RBC AUTO-ENTMCNC: 31.6 G/DL (ref 30–36.5)
MCV RBC AUTO: 86.6 FL (ref 80–99)
MONOCYTES # BLD: 0.56 K/UL (ref 0–1)
MONOCYTES NFR BLD: 7.4 % (ref 5–13)
NEUTS SEG # BLD: 4.59 K/UL (ref 1.8–8)
NEUTS SEG NFR BLD: 60.4 % (ref 32–75)
NRBC # BLD: 0 K/UL (ref 0–0.01)
NRBC BLD-RTO: 0 PER 100 WBC
PLATELET # BLD AUTO: 472 K/UL (ref 150–400)
PMV BLD AUTO: 10 FL (ref 8.9–12.9)
PROTHROMBIN TIME: 11.3 SEC (ref 9.2–11.2)
RBC # BLD AUTO: 4.49 M/UL (ref 4.1–5.7)
RBC MORPH BLD: ABNORMAL
THERAPEUTIC RANGE: NORMAL SECS (ref 58–77)
WBC # BLD AUTO: 7.6 K/UL (ref 4.1–11.1)

## 2025-05-09 ENCOUNTER — HOSPITAL ENCOUNTER (OUTPATIENT)
Facility: HOSPITAL | Age: 55
Discharge: HOME | End: 2025-05-12
Payer: MEDICAID

## 2025-05-09 ENCOUNTER — TELEPHONE (OUTPATIENT)
Dept: FAMILY MEDICINE CLINIC | Age: 55
End: 2025-05-09

## 2025-05-09 ENCOUNTER — OFFICE VISIT (OUTPATIENT)
Dept: FAMILY MEDICINE CLINIC | Age: 55
End: 2025-05-09
Payer: MEDICAID

## 2025-05-09 ENCOUNTER — RESULTS FOLLOW-UP (OUTPATIENT)
Dept: FAMILY MEDICINE CLINIC | Age: 55
End: 2025-05-09

## 2025-05-09 VITALS
RESPIRATION RATE: 16 BRPM | SYSTOLIC BLOOD PRESSURE: 101 MMHG | WEIGHT: 181.5 LBS | OXYGEN SATURATION: 95 % | TEMPERATURE: 96.9 F | DIASTOLIC BLOOD PRESSURE: 71 MMHG | HEART RATE: 105 BPM | HEIGHT: 70 IN | BODY MASS INDEX: 25.98 KG/M2

## 2025-05-09 DIAGNOSIS — M25.50 POLYARTHRALGIA: Primary | ICD-10-CM

## 2025-05-09 DIAGNOSIS — M25.50 POLYARTHRALGIA: ICD-10-CM

## 2025-05-09 LAB — ANA SER QL: NEGATIVE

## 2025-05-09 PROCEDURE — 99213 OFFICE O/P EST LOW 20 MIN: CPT | Performed by: FAMILY MEDICINE

## 2025-05-09 RX ORDER — PREDNISONE 20 MG/1
TABLET ORAL
Qty: 30 TABLET | Refills: 0 | Status: SHIPPED | OUTPATIENT
Start: 2025-05-09

## 2025-05-09 RX ORDER — PREDNISONE 20 MG/1
TABLET ORAL
Qty: 10 TABLET | Refills: 0 | Status: SHIPPED | OUTPATIENT
Start: 2025-05-09 | End: 2025-05-09 | Stop reason: SDUPTHER

## 2025-05-09 ASSESSMENT — PATIENT HEALTH QUESTIONNAIRE - PHQ9
2. FEELING DOWN, DEPRESSED OR HOPELESS: SEVERAL DAYS
SUM OF ALL RESPONSES TO PHQ QUESTIONS 1-9: 2
SUM OF ALL RESPONSES TO PHQ QUESTIONS 1-9: 2
1. LITTLE INTEREST OR PLEASURE IN DOING THINGS: SEVERAL DAYS
SUM OF ALL RESPONSES TO PHQ QUESTIONS 1-9: 2
SUM OF ALL RESPONSES TO PHQ QUESTIONS 1-9: 2

## 2025-05-09 NOTE — PROGRESS NOTES
Have you been to the ER, urgent care clinic since your last visit?  Hospitalized since your last visit?   NO    Have you seen or consulted any other health care providers outside our system since your last visit?   NO      “Have you had a colorectal cancer screening such as a colonoscopy/FIT/Cologuard?    NO    Date of last Colonoscopy: 10/22/2019  No cologuard on file  No FIT/FOBT on file   No flexible sigmoidoscopy on file   5/9/2025      Chief Complaint   Patient presents with    Foot Swelling    Swelling     Hand         History of Present Illness:         is a 55 y.o. male who woke with worsening hand arthralgias. Can't make a fist, shoulders hurt. Initial labs back confirming elevated ESR, eosinophilia. Rheumatic panel still pending.      No Known Allergies    Current Outpatient Medications   Medication Sig Dispense Refill    predniSONE (DELTASONE) 20 MG tablet Take two tabs daily for 5 days, then one tab daily 10 tablet 0    hydroCHLOROthiazide (HYDRODIURIL) 25 MG tablet Take 1 tablet by mouth daily      gabapentin (NEURONTIN) 250 MG/5ML solution Take 1 mL by mouth 2 times daily.      pregabalin (LYRICA) 50 MG capsule Take 1 capsule by mouth 2 times daily for 180 days. Max Daily Amount: 100 mg 180 capsule 1    amitriptyline (ELAVIL) 25 MG tablet Take 1 tablet by mouth nightly For lower extremity tingling 90 tablet 0    furosemide (LASIX) 20 MG tablet Take 1 tablet by mouth daily as needed (swelling in legs) 30 tablet 2    ipratropium 0.5 mg-albuterol 2.5 mg (DUONEB) 0.5-2.5 (3) MG/3ML SOLN nebulizer solution Inhale 3 mLs into the lungs every 6 hours as needed for Shortness of Breath 120 mL 0    acetaminophen (TYLENOL) 500 MG tablet Take 2 tablets by mouth daily as needed for Pain 14 tablet 0    fluticasone-salmeterol (ADVAIR) 250-50 MCG/ACT AEPB diskus inhaler Inhale 1 puff into the lungs in the morning and 1 puff in the evening. 60 each 3     No current facility-administered medications for this

## 2025-05-09 NOTE — TELEPHONE ENCOUNTER
Joanna Casas needs clarification of the directions for pt's prednisone.     Directions don't match the sig

## 2025-05-11 LAB
ALBUMIN SERPL ELPH-MCNC: 3.1 G/DL (ref 2.9–4.4)
ALBUMIN/GLOB SERPL: 0.7 (ref 0.7–1.7)
ALPHA1 GLOB SERPL ELPH-MCNC: 0.4 G/DL (ref 0–0.4)
ALPHA2 GLOB SERPL ELPH-MCNC: 1 G/DL (ref 0.4–1)
B-GLOBULIN SERPL ELPH-MCNC: 1 G/DL (ref 0.7–1.3)
GAMMA GLOB SERPL ELPH-MCNC: 2.1 G/DL (ref 0.4–1.8)
GLOBULIN SER-MCNC: 4.6 G/DL (ref 2.2–3.9)
IGA SERPL-MCNC: 155 MG/DL (ref 90–386)
IGG SERPL-MCNC: 1934 MG/DL (ref 603–1613)
IGM SERPL-MCNC: 749 MG/DL (ref 20–172)
INTERPRETATION SERPL IEP-IMP: ABNORMAL
KAPPA LC FREE SER-MCNC: 109 MG/L (ref 3.3–19.4)
KAPPA LC FREE/LAMBDA FREE SER: 2.48 (ref 0.26–1.65)
LAMBDA LC FREE SERPL-MCNC: 44 MG/L (ref 5.7–26.3)
M PROTEIN SERPL ELPH-MCNC: ABNORMAL G/DL
PROT SERPL-MCNC: 7.7 G/DL (ref 6–8.5)

## 2025-05-13 ENCOUNTER — TELEPHONE (OUTPATIENT)
Age: 55
End: 2025-05-13

## 2025-05-16 ENCOUNTER — OFFICE VISIT (OUTPATIENT)
Dept: FAMILY MEDICINE CLINIC | Age: 55
End: 2025-05-16
Payer: MEDICAID

## 2025-05-16 VITALS
HEIGHT: 70 IN | SYSTOLIC BLOOD PRESSURE: 131 MMHG | RESPIRATION RATE: 16 BRPM | HEART RATE: 87 BPM | OXYGEN SATURATION: 95 % | TEMPERATURE: 97 F | DIASTOLIC BLOOD PRESSURE: 83 MMHG | BODY MASS INDEX: 25.77 KG/M2 | WEIGHT: 180 LBS

## 2025-05-16 DIAGNOSIS — J45.41 MODERATE PERSISTENT ASTHMA WITH EXACERBATION: ICD-10-CM

## 2025-05-16 DIAGNOSIS — K64.4 EXTERNAL HEMORRHOID: ICD-10-CM

## 2025-05-16 DIAGNOSIS — D72.10 EOSINOPHILIA, UNSPECIFIED TYPE: Primary | ICD-10-CM

## 2025-05-16 DIAGNOSIS — K59.03 DRUG-INDUCED CONSTIPATION: ICD-10-CM

## 2025-05-16 PROCEDURE — 99214 OFFICE O/P EST MOD 30 MIN: CPT | Performed by: FAMILY MEDICINE

## 2025-05-16 PROCEDURE — 36415 COLL VENOUS BLD VENIPUNCTURE: CPT | Performed by: FAMILY MEDICINE

## 2025-05-16 RX ORDER — SENNOSIDES A AND B 8.6 MG/1
1 TABLET, FILM COATED ORAL 2 TIMES DAILY
Qty: 20 TABLET | Refills: 0 | Status: SHIPPED | OUTPATIENT
Start: 2025-05-16 | End: 2026-05-16

## 2025-05-16 RX ORDER — FLUTICASONE PROPIONATE AND SALMETEROL 250; 50 UG/1; UG/1
1 POWDER RESPIRATORY (INHALATION) EVERY 12 HOURS
Qty: 60 EACH | Refills: 3 | Status: SHIPPED | OUTPATIENT
Start: 2025-05-16

## 2025-05-16 RX ORDER — HYDROCORTISONE 25 MG/G
CREAM TOPICAL 2 TIMES DAILY
Qty: 28 G | Refills: 1 | Status: SHIPPED | OUTPATIENT
Start: 2025-05-16

## 2025-05-16 ASSESSMENT — PATIENT HEALTH QUESTIONNAIRE - PHQ9
SUM OF ALL RESPONSES TO PHQ QUESTIONS 1-9: 2
1. LITTLE INTEREST OR PLEASURE IN DOING THINGS: SEVERAL DAYS
SUM OF ALL RESPONSES TO PHQ QUESTIONS 1-9: 2
SUM OF ALL RESPONSES TO PHQ QUESTIONS 1-9: 2
2. FEELING DOWN, DEPRESSED OR HOPELESS: SEVERAL DAYS
SUM OF ALL RESPONSES TO PHQ QUESTIONS 1-9: 2

## 2025-05-16 NOTE — PROGRESS NOTES
Successful venipuncture in patient's right forearm X 1 sticks.  The patient tolerated this procedure w/o c/o pain or discomfort.

## 2025-05-16 NOTE — PROGRESS NOTES
Have you been to the ER, urgent care clinic since your last visit?  Hospitalized since your last visit?   No    Have you seen or consulted any other health care providers outside our system since your last visit?   No      “Have you had a colorectal cancer screening such as a colonoscopy/FIT/Cologuard?    NO    Date of last Colonoscopy: 10/22/2019  No cologuard on file  No FIT/FOBT on file   No flexible sigmoidoscopy on file   5/16/2025      Chief Complaint   Patient presents with    Results    Foot Pain    Insomnia         History of Present Illness:         is a 55 y.o. male to follow up complex constellation of symptoms. Feels better on the prednisone, now at 20 mg daily. RF negative, but full rheumassure pending. EMG scheduled for 5/21. Has developed constipation and excessive sleepiness on current meds. Now has a hemorrhoid.      No Known Allergies    Current Outpatient Medications   Medication Sig Dispense Refill    Fluticasone-Umeclidin-Vilant (TRELEGY ELLIPTA IN) Inhale into the lungs      hydrocortisone (ANUSOL-HC) 2.5 % CREA rectal cream Place rectally 2 times daily 28 g 1    senna (SENOKOT) 8.6 MG tablet Take 1 tablet by mouth 2 times daily 20 tablet 0    fluticasone-salmeterol (ADVAIR) 250-50 MCG/ACT AEPB diskus inhaler Inhale 1 puff into the lungs in the morning and 1 puff in the evening. 60 each 3    predniSONE (DELTASONE) 20 MG tablet Take two tabs daily for 5 days, then one tab daily 30 tablet 0    hydroCHLOROthiazide (HYDRODIURIL) 25 MG tablet Take 1 tablet by mouth daily      pregabalin (LYRICA) 50 MG capsule Take 1 capsule by mouth 2 times daily for 180 days. Max Daily Amount: 100 mg 180 capsule 1    ipratropium 0.5 mg-albuterol 2.5 mg (DUONEB) 0.5-2.5 (3) MG/3ML SOLN nebulizer solution Inhale 3 mLs into the lungs every 6 hours as needed for Shortness of Breath 120 mL 0    acetaminophen (TYLENOL) 500 MG tablet Take 2 tablets by mouth daily as needed for Pain 14 tablet 0     No current

## 2025-05-17 LAB
14-3-3 ETA AG SER IA-MCNC: <0.2 NG/ML
RHEUMATOID FACT SERPL-ACNC: <14 UNITS/ML

## 2025-05-20 LAB — HISTOPLASMA AG: NEGATIVE NG/ML

## 2025-05-21 ENCOUNTER — PROCEDURE VISIT (OUTPATIENT)
Age: 55
End: 2025-05-21
Payer: MEDICAID

## 2025-05-21 DIAGNOSIS — D89.89 IMMUNE-MEDIATED NEUROPATHY: Primary | ICD-10-CM

## 2025-05-21 DIAGNOSIS — G62.9 NEUROPATHY: ICD-10-CM

## 2025-05-21 DIAGNOSIS — G63 IMMUNE-MEDIATED NEUROPATHY: Primary | ICD-10-CM

## 2025-05-21 DIAGNOSIS — G60.8 IDIOPATHIC SMALL AND LARGE FIBER SENSORY NEUROPATHY: ICD-10-CM

## 2025-05-21 LAB
14-3-3 ETA AG SER IA-MCNC: <0.2 NG/ML
A FLAVUS AB SER QL ID: NEGATIVE
A FUMIGATUS AB SER QL ID: NEGATIVE
A NIGER AB SER QL ID: NEGATIVE
CCP IGA+IGG SERPL IA-ACNC: <20 UNITS
RHEUMATOID FACT SERPL-ACNC: <14 UNITS/ML

## 2025-05-21 PROCEDURE — 95913 NRV CNDJ TEST 13/> STUDIES: CPT | Performed by: PSYCHIATRY & NEUROLOGY

## 2025-05-21 PROCEDURE — 95886 MUSC TEST DONE W/N TEST COMP: CPT | Performed by: PSYCHIATRY & NEUROLOGY

## 2025-05-21 NOTE — PROCEDURES
PROCEDURE NOTE  Date: 2025   Name: Mark Ford  YOB: 1970    Procedures        ELECTRODIANOSTIC REPORT  Test Date:  2025    Patient: Sherly Ford : 1970 Physician: Micah Valera MD   Sex: Male Tech: SANTA LeviNCT  Ref Phys:      Technician: Mitzi Adam    Clinical Indication: Patient is a 55-year-old male for an EMG study because of severe pain and numbness in his feet and severe joint pain in his hands and feet, that gets better with steroids, and the patient is not a diabetic.  Patient has no family history of similar problems, and apparently this all came on after he had developed pneumonia about 3 months ago.    Neuro exam: Patient has slight decrease sensation to pin temperature and vibration in his feet to about ankle level, and has some difficulty with weakness in his toe extensors and flexors in his feet.  He has hypoactive reflexes throughout and no Babinski or clonus present.  His proximal leg strength and arm strength is all normal.  Patient is a little agitated and in pain, but cranial nerves II through XII are intact.      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  There did not appear to be any clear evidence of a motor radiculopathy, primary myopathy, or entrapment neuropathy on the basis of this study.  Clinical correlation is recommended, correlation with appropriate metabolic studies and imaging modalities may be of further diagnostic benefit if clinic indicated.:        EMG & NCV Findings:  Evaluation of the left Fibular motor and the right Fibular motor nerves showed no response (Ankle), no response (B Fib), and no response (Poplt).  The right median motor nerve showed reduced amplitude

## 2025-05-22 LAB — C IMMITIS AB TITR SER CF: NEGATIVE

## 2025-05-22 NOTE — PROGRESS NOTES
Patient's EMG study showed electrophysiologic evidence of a probable moderate severe distal length-dependent demyelinating and axonal polyneuropathy consistent with various toxic, metabolic or autoimmune or acquired neuropathies.  He recommended neurologic evaluation and referral to rheumatology because of all of his joint pains and high sed rate

## 2025-06-03 ENCOUNTER — OFFICE VISIT (OUTPATIENT)
Dept: FAMILY MEDICINE CLINIC | Age: 55
End: 2025-06-03
Payer: MEDICAID

## 2025-06-03 VITALS
RESPIRATION RATE: 16 BRPM | SYSTOLIC BLOOD PRESSURE: 133 MMHG | HEART RATE: 81 BPM | TEMPERATURE: 97 F | DIASTOLIC BLOOD PRESSURE: 85 MMHG | WEIGHT: 183 LBS | HEIGHT: 70 IN | OXYGEN SATURATION: 96 % | BODY MASS INDEX: 26.2 KG/M2

## 2025-06-03 DIAGNOSIS — D72.10 EOSINOPHILIA, UNSPECIFIED TYPE: ICD-10-CM

## 2025-06-03 DIAGNOSIS — K64.4 EXTERNAL HEMORRHOID: ICD-10-CM

## 2025-06-03 DIAGNOSIS — G62.9 NEUROPATHY: ICD-10-CM

## 2025-06-03 DIAGNOSIS — M25.50 POLYARTHRALGIA: ICD-10-CM

## 2025-06-03 DIAGNOSIS — G61.0 ACUTE INFLAMMATORY DEMYELINATING POLYNEUROPATHY: Primary | ICD-10-CM

## 2025-06-03 PROCEDURE — 99214 OFFICE O/P EST MOD 30 MIN: CPT | Performed by: FAMILY MEDICINE

## 2025-06-03 RX ORDER — PREGABALIN 75 MG/1
75 CAPSULE ORAL 3 TIMES DAILY
Qty: 270 CAPSULE | Refills: 0 | Status: SHIPPED | OUTPATIENT
Start: 2025-06-03 | End: 2025-09-01

## 2025-06-03 RX ORDER — PREDNISONE 20 MG/1
TABLET ORAL
Qty: 30 TABLET | Refills: 2 | Status: SHIPPED | OUTPATIENT
Start: 2025-06-03

## 2025-06-03 ASSESSMENT — PATIENT HEALTH QUESTIONNAIRE - PHQ9
1. LITTLE INTEREST OR PLEASURE IN DOING THINGS: NOT AT ALL
SUM OF ALL RESPONSES TO PHQ QUESTIONS 1-9: 0
SUM OF ALL RESPONSES TO PHQ QUESTIONS 1-9: 0
2. FEELING DOWN, DEPRESSED OR HOPELESS: NOT AT ALL
SUM OF ALL RESPONSES TO PHQ QUESTIONS 1-9: 0
SUM OF ALL RESPONSES TO PHQ QUESTIONS 1-9: 0

## 2025-06-03 NOTE — PROGRESS NOTES
Have you been to the ER, urgent care clinic since your last visit?  Hospitalized since your last visit?   NO    Have you seen or consulted any other health care providers outside our system since your last visit?   NO      “Have you had a colorectal cancer screening such as a colonoscopy/FIT/Cologuard?    NO    Date of last Colonoscopy: 10/22/2019  No cologuard on file  No FIT/FOBT on file   No flexible sigmoidoscopy on file     6/3/2025      Chief Complaint   Patient presents with    3 week follow up     Leg Swelling    Numbness     Left Leg     Ankle Pain         History of Present Illness:         is a 55 y.o. male returns to follow up. Fungal and occupational infectious exposures are negative as is basic rheumatology panel. Feels better on prednisone. EMG demonstrated inflamatory demyelinating polyneuropathy.      No Known Allergies    Current Outpatient Medications   Medication Sig Dispense Refill    pregabalin (LYRICA) 75 MG capsule Take 1 capsule by mouth 3 times daily for 90 days. Max Daily Amount: 225 mg 270 capsule 0    predniSONE (DELTASONE) 20 MG tablet Take two tabs daily for 5 days, then one tab daily 30 tablet 2    hydrocortisone (ANUSOL-HC) 2.5 % CREA rectal cream Place rectally 2 times daily 28 g 1    senna (SENOKOT) 8.6 MG tablet Take 1 tablet by mouth 2 times daily 20 tablet 0    fluticasone-salmeterol (ADVAIR) 250-50 MCG/ACT AEPB diskus inhaler Inhale 1 puff into the lungs in the morning and 1 puff in the evening. 60 each 3    ipratropium 0.5 mg-albuterol 2.5 mg (DUONEB) 0.5-2.5 (3) MG/3ML SOLN nebulizer solution Inhale 3 mLs into the lungs every 6 hours as needed for Shortness of Breath 120 mL 0    acetaminophen (TYLENOL) 500 MG tablet Take 2 tablets by mouth daily as needed for Pain 14 tablet 0    hydroCHLOROthiazide (HYDRODIURIL) 25 MG tablet Take 1 tablet by mouth daily (Patient not taking: Reported on 6/3/2025)       No current facility-administered medications for this visit.

## 2025-07-30 NOTE — PROGRESS NOTES
Patient called back to let me know 9/15/25 will not work, asked if we can do 9/8/25. Rescheduled patient.   
Successful venipuncture in patient's Left Wrist  X 1 sticks.  The patient tolerated this procedure w/o c/o pain or discomfort.  
facility-administered medications for this visit.             Physical Examination:    /72 (BP Site: Left Upper Arm, Patient Position: Sitting, BP Cuff Size: Medium Adult)   Pulse (!) 102   Temp 97 °F (36.1 °C) (Oral)   Resp 16   Ht 1.778 m (5' 10\")   Wt 83.5 kg (184 lb)   SpO2 95%   BMI 26.40 kg/m²    General:  Alert, cooperative, no distress.   HEENT:  Normocephalic, without obvious abnormality, atraumatic.Conjunctivae/corneas clear. Pupils equal, round, reactive to light. Extraocular movements intact.TMs and external canals normal bilaterally. Nasal mucosa and oropharynx clear.   Lungs: Clear to auscultation bilaterally.   Chest wall:  No tenderness or deformity.   Heart:  Regular rate and rhythm, S1, S2 normal, no murmur, click, rub, or gallop.   Abdomen:   Soft, non-tender. Bowel sounds normal. No masses. No organomegaly.   Extremities: Extremities normal, atraumatic, no cyanosis. Trace pedal edema   Pulses: 2+ and symmetric all extremities.   Skin: Skin color, texture, turgor normal. Skin of feet dusky and purpuric in appearance   Lymph nodes: Cervical, supraclavicular, and axillary nodes normal.   Neurologic: CNII-XII intact. Normal strength, sensation, and reflexes throughout.         ASSESSMENT AND PLAN    1. Polyarthralgia  Resolution post abx suggests post infectious source despite negative tick born serology. No current evidence of synovitis  - RheumAssure; Future  - DARSHAN, Direct, w/Reflex; Future  - Sedimentation Rate; Future  - CBC with Auto Differential; Future  - RheumAssure  - Sedimentation Rate  - CBC with Auto Differential  - DARSHAN, Direct, w/Reflex    2. 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 Needs to complete NCS/EMG. This may help define if injury is inflammatory. Post infectious inflammatory neuropathy also a consideration. All of these may be compounded by adrenal supression  - Gammopathy Eval, SPEP/BETH, IG Qt/FLC; Future  - Gammopathy Eval, SPEP/BETH, IG

## 2025-08-07 ENCOUNTER — OFFICE VISIT (OUTPATIENT)
Age: 55
End: 2025-08-07
Payer: MEDICAID

## 2025-08-07 VITALS
BODY MASS INDEX: 26.2 KG/M2 | HEIGHT: 70 IN | TEMPERATURE: 97.8 F | RESPIRATION RATE: 17 BRPM | HEART RATE: 71 BPM | OXYGEN SATURATION: 100 % | DIASTOLIC BLOOD PRESSURE: 84 MMHG | SYSTOLIC BLOOD PRESSURE: 116 MMHG | WEIGHT: 183 LBS

## 2025-08-07 DIAGNOSIS — D89.89 IMMUNE-MEDIATED NEUROPATHY: ICD-10-CM

## 2025-08-07 DIAGNOSIS — Z51.81 THERAPEUTIC DRUG MONITORING: ICD-10-CM

## 2025-08-07 DIAGNOSIS — G43.719 INTRACTABLE CHRONIC MIGRAINE WITHOUT AURA AND WITHOUT STATUS MIGRAINOSUS: ICD-10-CM

## 2025-08-07 DIAGNOSIS — G60.8 IDIOPATHIC SMALL AND LARGE FIBER SENSORY NEUROPATHY: Primary | ICD-10-CM

## 2025-08-07 DIAGNOSIS — G60.8 IDIOPATHIC SMALL AND LARGE FIBER SENSORY NEUROPATHY: ICD-10-CM

## 2025-08-07 DIAGNOSIS — G63 IMMUNE-MEDIATED NEUROPATHY: ICD-10-CM

## 2025-08-07 PROCEDURE — 99215 OFFICE O/P EST HI 40 MIN: CPT | Performed by: PSYCHIATRY & NEUROLOGY

## 2025-08-07 RX ORDER — M-VIT,TX,IRON,MINS/CALC/FOLIC 27MG-0.4MG
1 TABLET ORAL DAILY
COMMUNITY

## 2025-08-07 RX ORDER — PREGABALIN 100 MG/1
100 CAPSULE ORAL 3 TIMES DAILY
Qty: 90 CAPSULE | Refills: 5 | Status: SHIPPED | OUTPATIENT
Start: 2025-08-07 | End: 2026-02-08

## 2025-08-07 RX ORDER — ACETAMINOPHEN 160 MG
2000 TABLET,DISINTEGRATING ORAL DAILY
COMMUNITY

## 2025-08-07 ASSESSMENT — PATIENT HEALTH QUESTIONNAIRE - PHQ9
SUM OF ALL RESPONSES TO PHQ QUESTIONS 1-9: 2
SUM OF ALL RESPONSES TO PHQ QUESTIONS 1-9: 2
1. LITTLE INTEREST OR PLEASURE IN DOING THINGS: SEVERAL DAYS
2. FEELING DOWN, DEPRESSED OR HOPELESS: SEVERAL DAYS
SUM OF ALL RESPONSES TO PHQ QUESTIONS 1-9: 2
SUM OF ALL RESPONSES TO PHQ QUESTIONS 1-9: 2

## 2025-08-08 LAB
ERYTHROCYTE [SEDIMENTATION RATE] IN BLOOD BY WESTERGREN METHOD: 44 MM/HR (ref 0–30)
EST. AVERAGE GLUCOSE BLD GHB EST-MCNC: 108 MG/DL
FOLATE SERPL-MCNC: 9.7 NG/ML
HBA1C MFR BLD: 5.4 % (ref 4.8–5.6)
KAPPA LC FREE SER-MCNC: 34.8 MG/L (ref 3.3–19.4)
KAPPA LC FREE/LAMBDA FREE SER: 1.7 {RATIO} (ref 0.26–1.65)
LAMBDA LC FREE SERPL-MCNC: 20.5 MG/L (ref 5.7–26.3)
VIT B12 SERPL-MCNC: 995 PG/ML (ref 232–1245)

## 2025-08-09 LAB
ALBUMIN SERPL ELPH-MCNC: 3.5 G/DL (ref 2.9–4.4)
ALBUMIN/GLOB SERPL: 0.9 {RATIO} (ref 0.7–1.7)
ALPHA1 GLOB SERPL ELPH-MCNC: 0.3 G/DL (ref 0–0.4)
ALPHA2 GLOB SERPL ELPH-MCNC: 1 G/DL (ref 0.4–1)
B-GLOBULIN SERPL ELPH-MCNC: 1.1 G/DL (ref 0.7–1.3)
GAMMA GLOB SERPL ELPH-MCNC: 1.6 G/DL (ref 0.4–1.8)
GLOBULIN SER-MCNC: 4 G/DL (ref 2.2–3.9)
IGA SERPL-MCNC: 126 MG/DL (ref 90–386)
IGG SERPL-MCNC: 1647 MG/DL (ref 603–1613)
IGM SERPL-MCNC: 410 MG/DL (ref 20–172)
INTERPRETATION SERPL IEP-IMP: ABNORMAL
LABORATORY COMMENT REPORT: ABNORMAL
M PROTEIN SERPL ELPH-MCNC: ABNORMAL G/DL
PROT SERPL-MCNC: 7.5 G/DL (ref 6–8.5)

## 2025-08-10 LAB — PYRIDOXAL PHOS SERPL-MCNC: 8.9 UG/L (ref 3.4–65.2)

## 2025-08-12 LAB
AMPAR1 AB SER QL IF: NEGATIVE
AMPAR2 AB SER QL IF: NEGATIVE
AMPHIPHYSIN AB SER QL: NEGATIVE
AQP4 H2O CHANNEL AB SERPL IA-ACNC: NEGATIVE
CASPR2 IGG SERPL QL IF: NEGATIVE
COPPER SERPL-MCNC: 118 UG/DL (ref 69–132)
CV2 IGG SER-ACNC: NEGATIVE
DPPX IGG SERPL QL IF: NEGATIVE
GABABR AB SER QL IF: NEGATIVE
GAD65 IGG+IGM SER IA-SCNC: NEGATIVE NMOL/L
GLIAL NUC TYPE 1 AB SER QL IF: NEGATIVE
GM1 GANGL IGG TITR SER IA: 23 % (ref 0–30)
HU AB SER QL IF: NEGATIVE
HU2 AB SER QL IF: NEGATIVE
HU3 AB SER QL: NEGATIVE
IGLON5 IGG SER QL IF: NEGATIVE
IMP & REVIEW OF LAB RESULTS: NEGATIVE
INTERPRETATION: NORMAL
IP3R 1 IGG SER QL IF: NEGATIVE
LGI1 IGG SERPL QL IF: NEGATIVE
MA+TA AB SER QL: NEGATIVE
MAG IGM SER-ACNC: <900 BTU (ref 0–999)
MGLUR1 IGG SER QL IF: NEGATIVE
NARRATIVE DIAGNOSTIC REPORT-IMP: NORMAL
NMDAR1 AB SER QL IF: NEGATIVE
PCA-1 AB SER QL IF: NEGATIVE
PCA-2 AB SER QL IF: NEGATIVE
PCA-TR AB SER QL IF: NEGATIVE
PCA-TR AB SER QL IF: NEGATIVE
VGCC AB SER-SCNC: <1 PMOL/L (ref 0–30)
VIT B1 BLD-SCNC: 118.7 NMOL/L (ref 66.5–200)
ZIC4 AB SER QL: NEGATIVE
ZINC BLD-MCNC: 562 UG/DL (ref 440–860)

## 2025-08-18 LAB
ANTI NEURONAL CELL AB METHOD: NORMAL
INTERPRETATION: NORMAL
NEURONAL AB SER IA-ACNC: 24 UNITS (ref 0–54)

## 2025-09-03 ENCOUNTER — TELEPHONE (OUTPATIENT)
Age: 55
End: 2025-09-03